# Patient Record
Sex: FEMALE | Race: WHITE | NOT HISPANIC OR LATINO | Employment: OTHER | URBAN - METROPOLITAN AREA
[De-identification: names, ages, dates, MRNs, and addresses within clinical notes are randomized per-mention and may not be internally consistent; named-entity substitution may affect disease eponyms.]

---

## 2020-02-13 ENCOUNTER — OFFICE VISIT (OUTPATIENT)
Dept: PODIATRY | Facility: CLINIC | Age: 55
End: 2020-02-13
Payer: MEDICAID

## 2020-02-13 VITALS
HEART RATE: 89 BPM | OXYGEN SATURATION: 99 % | RESPIRATION RATE: 18 BRPM | SYSTOLIC BLOOD PRESSURE: 123 MMHG | HEIGHT: 67 IN | DIASTOLIC BLOOD PRESSURE: 7 MMHG | WEIGHT: 260 LBS | BODY MASS INDEX: 40.81 KG/M2 | TEMPERATURE: 98 F

## 2020-02-13 DIAGNOSIS — Z89.432 HISTORY OF AMPUTATION OF LEFT FOOT: ICD-10-CM

## 2020-02-13 DIAGNOSIS — L97.521 SKIN ULCER OF LEFT FOOT, LIMITED TO BREAKDOWN OF SKIN: ICD-10-CM

## 2020-02-13 DIAGNOSIS — M32.9 LUPUS: ICD-10-CM

## 2020-02-13 DIAGNOSIS — S91.301A WOUND OF RIGHT FOOT: Primary | ICD-10-CM

## 2020-02-13 DIAGNOSIS — M06.9 RHEUMATOID ARTHRITIS, INVOLVING UNSPECIFIED SITE, UNSPECIFIED RHEUMATOID FACTOR PRESENCE: ICD-10-CM

## 2020-02-13 DIAGNOSIS — I73.9 PERIPHERAL VASCULAR DISEASE: ICD-10-CM

## 2020-02-13 PROCEDURE — 97597 WOUND DEBRIDEMENT: ICD-10-PCS | Mod: S$PBB,,, | Performed by: PODIATRIST

## 2020-02-13 PROCEDURE — 99214 OFFICE O/P EST MOD 30 MIN: CPT | Mod: 25,S$PBB,, | Performed by: PODIATRIST

## 2020-02-13 PROCEDURE — 99203 OFFICE O/P NEW LOW 30 MIN: CPT | Mod: PBBFAC,PN,25 | Performed by: PODIATRIST

## 2020-02-13 PROCEDURE — 99214 PR OFFICE/OUTPT VISIT, EST, LEVL IV, 30-39 MIN: ICD-10-PCS | Mod: 25,S$PBB,, | Performed by: PODIATRIST

## 2020-02-13 PROCEDURE — 99999 PR PBB SHADOW E&M-NEW PATIENT-LVL III: CPT | Mod: PBBFAC,,, | Performed by: PODIATRIST

## 2020-02-13 PROCEDURE — 99999 PR PBB SHADOW E&M-NEW PATIENT-LVL III: ICD-10-PCS | Mod: PBBFAC,,, | Performed by: PODIATRIST

## 2020-02-13 PROCEDURE — 97597 DBRDMT OPN WND 1ST 20 CM/<: CPT | Mod: PBBFAC,PN | Performed by: PODIATRIST

## 2020-02-13 RX ORDER — ALBUTEROL SULFATE 0.83 MG/ML
2.5 SOLUTION RESPIRATORY (INHALATION)
COMMUNITY
Start: 2016-01-19 | End: 2022-02-22

## 2020-02-13 RX ORDER — ATORVASTATIN CALCIUM 10 MG/1
10 TABLET, FILM COATED ORAL
COMMUNITY
Start: 2019-11-18 | End: 2022-09-24 | Stop reason: SDUPTHER

## 2020-02-13 RX ORDER — GABAPENTIN 600 MG/1
600 TABLET ORAL
COMMUNITY
Start: 2019-11-18

## 2020-02-13 RX ORDER — SULFAMETHOXAZOLE AND TRIMETHOPRIM 800; 160 MG/1; MG/1
TABLET ORAL
COMMUNITY
Start: 2019-12-30 | End: 2020-11-03

## 2020-02-13 RX ORDER — MORPHINE SULFATE 30 MG/1
30 TABLET ORAL
COMMUNITY
End: 2021-10-21

## 2020-02-13 RX ORDER — OXYCODONE HYDROCHLORIDE 30 MG/1
TABLET ORAL
COMMUNITY
Start: 2020-02-07 | End: 2024-03-11 | Stop reason: SDUPTHER

## 2020-02-13 RX ORDER — PREDNISONE 20 MG/1
TABLET ORAL
COMMUNITY
Start: 2019-12-30 | End: 2021-05-19 | Stop reason: ALTCHOICE

## 2020-02-13 RX ORDER — ASPIRIN 81 MG/1
81 TABLET ORAL
COMMUNITY

## 2020-02-13 RX ORDER — DULOXETIN HYDROCHLORIDE 60 MG/1
60 CAPSULE, DELAYED RELEASE ORAL
COMMUNITY
Start: 2019-11-18 | End: 2021-10-21

## 2020-02-13 NOTE — LETTER
February 16, 2020      Lashawn Griffiths, DO  [address] [place] [facility] [place] [address]           [facility] - Podiatry/Wound Care  [facility] [place] [address]  Phone: [phone]  Fax: [phone]          Patient: Nevaeh Howard   MR Number: [id number]   YOB: 1965   Date of Visit: 2/13/2020       Dear Dr. Lashawn Griffiths:    Thank you for referring Nevaeh Howard to me for evaluation. Attached you will find relevant portions of my assessment and plan of care.    If you have questions, please do not hesitate to call me. I look forward to following Nevaeh Howard along with you.    Sincerely,    Al Blevins, DONN    Enclosure  CC:  No Recipients    If you would like to receive this communication electronically, please contact [email or web address] or [phone] to request more information on [employer] access.    For providers and/or their staff who would like to refer a patient to [facility], please contact us through our one-stop-shop provider referral line, [facility], at [phone].    If you feel you have received this communication in error or would no longer like to receive these types of communications, please e-mail [email or web address]

## 2020-02-16 PROBLEM — S91.301A WOUND OF RIGHT FOOT: Status: ACTIVE | Noted: 2020-02-16

## 2020-02-16 PROBLEM — M06.9 RHEUMATOID ARTHRITIS: Status: ACTIVE | Noted: 2020-02-16

## 2020-02-16 PROBLEM — Z89.432 HISTORY OF AMPUTATION OF LEFT FOOT: Status: ACTIVE | Noted: 2020-02-16

## 2020-02-16 PROBLEM — L97.521 SKIN ULCER OF LEFT FOOT, LIMITED TO BREAKDOWN OF SKIN: Status: ACTIVE | Noted: 2020-02-16

## 2020-02-16 PROBLEM — I73.9 PERIPHERAL VASCULAR DISEASE: Status: ACTIVE | Noted: 2020-02-16

## 2020-02-16 PROBLEM — M32.9 LUPUS: Status: ACTIVE | Noted: 2020-02-16

## 2020-02-16 NOTE — PROCEDURES
"Wound Debridement  Date/Time: 2/13/2020 10:15 AM  Performed by: Al Blevins DPM  Authorized by: Al Blevins DPM     Time out: Immediately prior to procedure a "time out" was called to verify the correct patient, procedure, equipment, support staff and site/side marked as required.    Consent Done?:  Yes (Verbal)    Preparation: Patient was prepped and draped in usual sterile fashion    Local anesthesia used?: No      Wound Details:    Location:  Left foot    Location:  Left 5th Metatarsal Head    Type of Debridement:  Excisional       Length (cm):  1.5       Area (sq cm):  2.25       Width (cm):  1.5       Percent Debrided (%):  100       Depth (cm):  0.5       Total Area Debrided (sq cm):  2.25    Depth of debridement:  Epidermis/Dermis    Devitalized tissue debrided:  Callus    Instruments:  Blade    Bleeding:  None  Patient tolerance:  Patient tolerated the procedure well with no immediate complications      "

## 2020-02-16 NOTE — PROGRESS NOTES
Subjective:       Patient ID: Nevaeh Howard is a 54 y.o. female.    Chief Complaint: Follow-up; Foot Pain; Foot Problem; Callouses; and Foot Ulcer   Patient presents today she has an extensive medical history and is currently being treated by a rheumatologist for lupus and rheumatoid arthritis.  Patient has a history of poor circulation she has previously had digits amputated on the left foot.  Patient is currently taking Eliquis.  Patient has been referred to us for a wound an ulcerative area on the left foot.    Past Medical History:   Diagnosis Date    Lung disease     Lupus     RA (rheumatoid arthritis)      Past Surgical History:   Procedure Laterality Date    APPENDECTOMY      BACK SURGERY      CHOLECYSTECTOMY      ENDOMICROSCOPY      HYSTERECTOMY      TOE AMPUTATION      2345 left foot     TONSILLECTOMY       Family History   Problem Relation Age of Onset    Cancer Mother     Heart disease Father     Stroke Father      Social History     Socioeconomic History    Marital status: Single     Spouse name: Not on file    Number of children: Not on file    Years of education: Not on file    Highest education level: Not on file   Occupational History    Not on file   Social Needs    Financial resource strain: Not on file    Food insecurity:     Worry: Not on file     Inability: Not on file    Transportation needs:     Medical: Not on file     Non-medical: Not on file   Tobacco Use    Smoking status: Former Smoker     Years: 40.00     Types: Cigarettes    Smokeless tobacco: Never Used    Tobacco comment: quit date 2018   Substance and Sexual Activity    Alcohol use: Never     Frequency: Never    Drug use: Never    Sexual activity: Not Currently   Lifestyle    Physical activity:     Days per week: Not on file     Minutes per session: Not on file    Stress: Not on file   Relationships    Social connections:     Talks on phone: Not on file     Gets together: Not on file     Attends Islam  "service: Not on file     Active member of club or organization: Not on file     Attends meetings of clubs or organizations: Not on file     Relationship status: Not on file   Other Topics Concern    Not on file   Social History Narrative    Not on file       Current Outpatient Medications   Medication Sig Dispense Refill    adalimumab 40 mg/0.4 mL PnKt Inject 40 mg into the skin.      albuterol (PROVENTIL) 2.5 mg /3 mL (0.083 %) nebulizer solution Inhale 2.5 mg into the lungs.      apixaban (ELIQUIS) 5 mg Tab Take 5 mg by mouth.      aspirin (ECOTRIN) 81 MG EC tablet Take 81 mg by mouth.      atorvastatin (LIPITOR) 10 MG tablet Take 10 mg by mouth.      DULoxetine (CYMBALTA) 60 MG capsule Take 60 mg by mouth.      FLUCELVAX QUAD 7978-6342 60 mcg (15 mcg x 4)/0.5 mL Susp       gabapentin (NEURONTIN) 600 MG tablet Take 600 mg by mouth.      morphine (MSIR) 30 MG tablet Take 30 mg by mouth.      oxyCODONE (ROXICODONE) 30 MG Tab       predniSONE (DELTASONE) 20 MG tablet       sulfamethoxazole-trimethoprim 800-160mg (BACTRIM DS) 800-160 mg Tab        No current facility-administered medications for this visit.      Review of patient's allergies indicates:   Allergen Reactions    Toradol [ketorolac]        Review of Systems    Objective:      Vitals:    02/13/20 1031   BP: (!) 123/7   Pulse: 89   Resp: 18   Temp: 98 °F (36.7 °C)   SpO2: 99%   Weight: 117.9 kg (260 lb)   Height: 5' 7" (1.702 m)     Physical Exam                Ulceration and area of injury right great toes approximately 1 cm in diameter the pre ulcerative hyperkeratotic lesion on the left foot is approximately 1.5 cm in diameter.     Assessment:       1. Skin ulcer of left foot, limited to breakdown of skin    2. Peripheral vascular disease    3. Lupus    4. History of amputation of left foot    5. Rheumatoid arthritis, involving unspecified site, unspecified rheumatoid factor presence        Plan:       Patient presents today she has an " extensive medical history and is currently being treated by a rheumatologist for lupus and rheumatoid arthritis.  Patient has a history of poor circulation she has previously had digits amputated on the left foot.  Patient is currently taking Eliquis.  Patient has been referred to us for a wound an ulcerative area on the left foot. Patient is had digits 2 through 5 amputated on the left foot due to poor circulation she states she has also had 3 stents placed which is made considerable difference in the circulation to her lower extremities.  Patient states that she was using a drill at home when the drill slipped hitting her right great toe about a week ago this area has been red swollen she has been applying antibiotic ointment to the area I did evaluate the area there is some redness some swelling I did not do a culture and sensitivity because the patient had antibiotic ointment all over the area this would likely not be accurate I did recommend cleaning the area with Dakin solution and keeping a light dressing on the area discontinuing the antibiotic ointment so the area can dry out.  Patient had a thick hyperkeratotic lesion underlying the 5th metatarsal head on left foot where she has had previous amputation I have advised the patient this is likely related to the amputation she is getting excessive pressure to the lateral portion of her left foot there may be some degenerative changes are bone spurring underlying area I was able to sharply excisionally debride the area removing all the hyperkeratotic nonviable tissue patient tolerated this well there was healthy underlying tissue no active signs of infection noted however I have advised the patient this area needs to be monitored closely to prevent breakdown further ulceration and subsequent infection especially with her history of previous amputation.  I have made some recommendations regarding a good moisturizing cream lotion that the patient can use on the  area to keep it soft to prevent the hyperkeratotic tissue buildup I have advised her this may need to be debrided again ultimately I will consider x-rays depending upon when in if this lesion recurs the patient states she has never had this treated before since the amputation of the digits 2 years ago. Patient advised we may have to make some modifications to her shoes but she needs to make sure she is wearing shoes that are good and supportive in the arch area to take pressure off the forefoot this will likely reduce the pressure overlying the 5th metatarsal head left.  Patient was dispensed Dakin solution to clean the ulceration on the right foot any increased redness swelling pain drainage she is to contact us immediately I did recommend following up with the patient in 6-8 weeks unless she has any problems or questions sooner.  Patient was very reluctant to allow me to trim and debride the ulceration left because her feet are very sensitive however this did not cause her any pain or discomfort.  Patient is not a diabetic she is on Eliquis and needs to monitor her feet closely to prevent complications.  Patient was made aware of the right great toe is not showing significant improvement in 7-10 days she is to contact me sooner for follow-up.  Total face-to-face time including discussion evaluation treatment excisional debridement and wound care bilateral equaled 45 min.This note was created using just[employer]me voice recognition software that occasionally misinterpreted phrases or words.

## 2020-11-03 ENCOUNTER — OFFICE VISIT (OUTPATIENT)
Dept: PODIATRY | Facility: CLINIC | Age: 55
End: 2020-11-03
Payer: MEDICAID

## 2020-11-03 VITALS
WEIGHT: 260 LBS | HEIGHT: 67 IN | BODY MASS INDEX: 40.81 KG/M2 | DIASTOLIC BLOOD PRESSURE: 85 MMHG | SYSTOLIC BLOOD PRESSURE: 153 MMHG | HEART RATE: 99 BPM | TEMPERATURE: 99 F

## 2020-11-03 DIAGNOSIS — Z89.432 HISTORY OF AMPUTATION OF LEFT FOOT: ICD-10-CM

## 2020-11-03 DIAGNOSIS — I99.8 ISCHEMIC TOE: ICD-10-CM

## 2020-11-03 DIAGNOSIS — I73.9 PERIPHERAL VASCULAR DISEASE: Primary | ICD-10-CM

## 2020-11-03 PROCEDURE — 99214 PR OFFICE/OUTPT VISIT, EST, LEVL IV, 30-39 MIN: ICD-10-PCS | Mod: S$PBB,,, | Performed by: PODIATRIST

## 2020-11-03 PROCEDURE — 99999 PR PBB SHADOW E&M-EST. PATIENT-LVL IV: ICD-10-PCS | Mod: PBBFAC,,, | Performed by: PODIATRIST

## 2020-11-03 PROCEDURE — 99214 OFFICE O/P EST MOD 30 MIN: CPT | Mod: PBBFAC,PN | Performed by: PODIATRIST

## 2020-11-03 PROCEDURE — 99999 PR PBB SHADOW E&M-EST. PATIENT-LVL IV: CPT | Mod: PBBFAC,,, | Performed by: PODIATRIST

## 2020-11-03 PROCEDURE — 99214 OFFICE O/P EST MOD 30 MIN: CPT | Mod: S$PBB,,, | Performed by: PODIATRIST

## 2020-11-07 PROBLEM — I99.8 ISCHEMIC TOE: Status: ACTIVE | Noted: 2020-11-07

## 2020-11-08 NOTE — PROGRESS NOTES
Subjective:       Patient ID: Nevaeh Howard is a 55 y.o. female.    Chief Complaint: Follow-up, Foot Pain, and Foot Problem   Patient presents today she has an extensive medical history and is currently being treated by a rheumatologist for lupus and rheumatoid arthritis.  Patient has a history of poor circulation she has previously had digits amputated on the left foot.  Patient is currently taking Eliquis.  Patient presents today stating she is having severe pain her right foot that started about 5 days ago.    Past Medical History:   Diagnosis Date    Lung disease     Lupus     RA (rheumatoid arthritis)      Past Surgical History:   Procedure Laterality Date    APPENDECTOMY      BACK SURGERY      CHOLECYSTECTOMY      ENDOMICROSCOPY      HYSTERECTOMY      TOE AMPUTATION      2345 left foot     TONSILLECTOMY       Family History   Problem Relation Age of Onset    Cancer Mother     Heart disease Father     Stroke Father      Social History     Socioeconomic History    Marital status: Single     Spouse name: Not on file    Number of children: Not on file    Years of education: Not on file    Highest education level: Not on file   Occupational History    Not on file   Social Needs    Financial resource strain: Not on file    Food insecurity     Worry: Not on file     Inability: Not on file    Transportation needs     Medical: Not on file     Non-medical: Not on file   Tobacco Use    Smoking status: Former Smoker     Years: 40.00     Types: Cigarettes    Smokeless tobacco: Never Used    Tobacco comment: quit date 2018   Substance and Sexual Activity    Alcohol use: Never     Frequency: Never    Drug use: Never    Sexual activity: Not Currently   Lifestyle    Physical activity     Days per week: Not on file     Minutes per session: Not on file    Stress: Not on file   Relationships    Social connections     Talks on phone: Not on file     Gets together: Not on file     Attends [employer]  "service: Not on file     Active member of club or organization: Not on file     Attends meetings of clubs or organizations: Not on file     Relationship status: Not on file   Other Topics Concern    Not on file   Social History Narrative    Not on file       Current Outpatient Medications   Medication Sig Dispense Refill    adalimumab 40 mg/0.4 mL PnKt Inject 40 mg into the skin.      albuterol (PROVENTIL) 2.5 mg /3 mL (0.083 %) nebulizer solution Inhale 2.5 mg into the lungs.      apixaban (ELIQUIS) 5 mg Tab Take 5 mg by mouth.      aspirin (ECOTRIN) 81 MG EC tablet Take 81 mg by mouth.      atorvastatin (LIPITOR) 10 MG tablet Take 10 mg by mouth.      DULoxetine (CYMBALTA) 60 MG capsule Take 60 mg by mouth.      FLUCELVAX QUAD [id number] 60 mcg (15 mcg x 4)/0.5 mL Susp       gabapentin (NEURONTIN) 600 MG tablet Take 600 mg by mouth.      morphine (MSIR) 30 MG tablet Take 30 mg by mouth.      oxyCODONE (ROXICODONE) 30 MG Tab       predniSONE (DELTASONE) 20 MG tablet        No current facility-administered medications for this visit.      Review of patient's allergies indicates:   Allergen Reactions    Ketorolac Hives     "Makes me break out"       Review of Systems   Skin: Positive for color change.   All other systems reviewed and are negative.      Objective:      Vitals:    11/03/20 1431   BP: (!) 153/85   Pulse: 99   Temp: 98.7 °F (37.1 °C)   Weight: 117.9 kg (260 lb)   Height: 5' 7" (1.702 m)     Physical Exam  Vitals signs and nursing note reviewed.   Constitutional:       Appearance: Normal appearance.   Cardiovascular:      Pulses:           Dorsalis pedis pulses are 0 on the right side and 1+ on the left side.        Posterior tibial pulses are 0 on the right side and 1+ on the left side.   Pulmonary:      Effort: Pulmonary effort is normal.   Musculoskeletal:         General: Tenderness present.        Feet:    Feet:      Right foot:      Protective Sensation: 2 sites tested. 2 sites sensed. "      Skin integrity: Erythema present.      Left foot:      Protective Sensation: 2 sites tested. 2 sites sensed.      Skin integrity: Callus present.   Skin:     Capillary Refill: Capillary refill takes more than 3 seconds.      Findings: Erythema present.   Neurological:      General: No focal deficit present.      Mental Status: She is alert.   Psychiatric:         Mood and Affect: Mood normal.         Behavior: Behavior normal.         Thought Content: Thought content normal.         Judgment: Judgment normal.                                              Assessment:       1. Peripheral vascular disease    2. History of amputation of left foot    3. Ischemic toe        Plan:       Patient presents today she has an extensive medical history and is currently being treated by a rheumatologist for lupus and rheumatoid arthritis.  Patient has a history of poor circulation she has previously had digits amputated on the left foot.  Patient is currently taking Eliquis.  Patient presents today stating she is having severe pain her right foot that started about 5 days ago.  Patient has a history of peripheral vascular disease she is previously seen vascular surgery at [facility] she states she has not been able to follow up with vascular surgery because they long longer accept her insurance.  Patient has had previous stent placement and states she was last seen by vascular surgery in February of 2020.  Patient relates no trauma or injury to the right foot however when the patient was in a dependent position the 2nd and 5th digit became blue and purple in coloration pulses in the right foot were nonpalpable skin temperature was warm to cold from the anterior tibia to the distal digits right.  Patient states that she is currently taking both Eliquis and her aspirin she is under the care of pain management but states her pain medication is not helping with her discomfort I explained to the patient that she is having  ischemic pain in the circulation is shutting down to the digits on her right foot I have advised the patient she needs to be seen by vascular surgery immediately sheets she has previously been treated at [facility] and has an established vascular surgeon over there I have recommended that she go to their emergency room immediately I have advised her this is a serious situation and I have recommended going directly to the emergency room trying to give her a referral to a vascular surgeon other than the 1 she has established with his going to take a longer period of time and certainly her vascular surgeon knows her well and she needs to follow up with him immediately.  Patient was in understanding and agreement with this I did apply Nitro-Bid ointment overlying the midfoot to help open up circulation to the 2nd and 5th digit I have given patient samples of Nitro-Bid ointment I showed her how to apply this to the dorsal aspect of the foot not to the digits themselves and cover the area with a light bandage she indicated she would be going to the emergency room immediately as recommended.  Patient has been advised to contact us with any problems questions or concerns but at this point her best option is to be seen by her vascular surgeon who is previously treated her.  Patient indicated she underwent amputation of the toes on the left foot under the same circumstances she is trying to avoid this on her right foot.  Patient was in understanding and agreement with everything discussed today in indicated she would follow my instructions and go to the emergency room as directed.  While the patient was in the office her digits turned more and more blue purple in color and her ischemic pain significantly increased.  Patient is also been having hip pain she does have a stent in her right side it is possible the stent may be blocked at this point.  Patient did have a pre ulcerative hyperkeratotic lesion on the left foot  but there were no current skin breaks or signs of infection on the left foot no open ulceration was noted on the right foot during today's exam.  This note was created using [employer] voice recognition software that occasionally misinterpreted phrases or words.

## 2021-05-19 ENCOUNTER — HOSPITAL ENCOUNTER (OUTPATIENT)
Dept: RADIOLOGY | Facility: HOSPITAL | Age: 56
Discharge: HOME | End: 2021-05-19
Attending: PODIATRIST
Payer: MEDICAID

## 2021-05-19 ENCOUNTER — OFFICE VISIT (OUTPATIENT)
Dept: PODIATRY | Facility: CLINIC | Age: 56
End: 2021-05-19
Payer: MEDICAID

## 2021-05-19 VITALS
BODY MASS INDEX: 40.81 KG/M2 | HEIGHT: 67 IN | SYSTOLIC BLOOD PRESSURE: 124 MMHG | DIASTOLIC BLOOD PRESSURE: 82 MMHG | WEIGHT: 260 LBS | RESPIRATION RATE: 18 BRPM | HEART RATE: 91 BPM

## 2021-05-19 DIAGNOSIS — Z89.432 HISTORY OF AMPUTATION OF LEFT FOOT: ICD-10-CM

## 2021-05-19 DIAGNOSIS — I73.9 PERIPHERAL VASCULAR DISEASE: ICD-10-CM

## 2021-05-19 DIAGNOSIS — L97.521 SKIN ULCER OF LEFT FOOT, LIMITED TO BREAKDOWN OF SKIN: Primary | ICD-10-CM

## 2021-05-19 PROCEDURE — 99214 PR OFFICE/OUTPT VISIT, EST, LEVL IV, 30-39 MIN: ICD-10-PCS | Mod: S$PBB,,, | Performed by: PODIATRIST

## 2021-05-19 PROCEDURE — 99214 OFFICE O/P EST MOD 30 MIN: CPT | Mod: S$PBB,,, | Performed by: PODIATRIST

## 2021-05-19 PROCEDURE — 99215 OFFICE O/P EST HI 40 MIN: CPT | Mod: PBBFAC,25 | Performed by: PODIATRIST

## 2021-05-19 PROCEDURE — 73630 XR FOOT COMPLETE 3 VIEW LEFT: ICD-10-PCS | Mod: 26,LT,, | Performed by: RADIOLOGY

## 2021-05-19 PROCEDURE — 99999 PR PBB SHADOW E&M-EST. PATIENT-LVL V: ICD-10-PCS | Mod: PBBFAC,,, | Performed by: PODIATRIST

## 2021-05-19 PROCEDURE — 99999 PR PBB SHADOW E&M-EST. PATIENT-LVL V: CPT | Mod: PBBFAC,,, | Performed by: PODIATRIST

## 2021-05-19 PROCEDURE — 73630 X-RAY EXAM OF FOOT: CPT | Mod: TC,FY,LT

## 2021-05-19 PROCEDURE — 73630 X-RAY EXAM OF FOOT: CPT | Mod: 26,LT,, | Performed by: RADIOLOGY

## 2021-05-19 RX ORDER — MORPHINE SULFATE 30 MG/1
TABLET, FILM COATED, EXTENDED RELEASE ORAL
COMMUNITY
Start: 2021-04-29 | End: 2021-09-05 | Stop reason: HOSPADM

## 2021-05-19 RX ORDER — RIVAROXABAN 20 MG/1
1 TABLET, FILM COATED ORAL NIGHTLY
COMMUNITY
Start: 2021-03-04 | End: 2022-02-22

## 2021-05-19 RX ORDER — GABAPENTIN 600 MG/1
600 TABLET ORAL
COMMUNITY
Start: 2021-01-07 | End: 2021-05-19 | Stop reason: SDUPTHER

## 2021-05-19 RX ORDER — ADALIMUMAB 40MG/0.4ML
40 KIT SUBCUTANEOUS
COMMUNITY
Start: 2021-05-03 | End: 2022-09-24

## 2021-05-19 RX ORDER — RIVAROXABAN 20 MG/1
20 TABLET, FILM COATED ORAL
COMMUNITY
Start: 2021-03-04 | End: 2022-02-22

## 2021-05-19 RX ORDER — AMLODIPINE BESYLATE 5 MG/1
5 TABLET ORAL DAILY
COMMUNITY
Start: 2021-03-04 | End: 2021-05-19 | Stop reason: SDUPTHER

## 2021-05-19 RX ORDER — ATORVASTATIN CALCIUM 10 MG/1
10 TABLET, FILM COATED ORAL
COMMUNITY
Start: 2021-01-07 | End: 2021-05-19 | Stop reason: SDUPTHER

## 2021-05-19 RX ORDER — ALBUTEROL SULFATE 90 UG/1
2 AEROSOL, METERED RESPIRATORY (INHALATION) EVERY 6 HOURS PRN
COMMUNITY
Start: 2021-01-07

## 2021-05-19 RX ORDER — ALBUTEROL SULFATE 90 UG/1
AEROSOL, METERED RESPIRATORY (INHALATION)
COMMUNITY
Start: 2021-02-27 | End: 2021-05-19

## 2021-05-19 RX ORDER — DULOXETIN HYDROCHLORIDE 60 MG/1
60 CAPSULE, DELAYED RELEASE ORAL
COMMUNITY
Start: 2021-01-07 | End: 2021-05-19 | Stop reason: SDUPTHER

## 2021-05-19 RX ORDER — PREDNISONE 5 MG/1
5 TABLET ORAL
COMMUNITY
Start: 2020-12-29 | End: 2021-10-21

## 2021-05-19 RX ORDER — CLOPIDOGREL BISULFATE 75 MG/1
75 TABLET ORAL DAILY
COMMUNITY
Start: 2021-03-04 | End: 2021-05-19 | Stop reason: SDUPTHER

## 2021-05-19 RX ORDER — AMLODIPINE BESYLATE 5 MG/1
5 TABLET ORAL
COMMUNITY
Start: 2021-03-04 | End: 2022-02-22

## 2021-05-19 RX ORDER — CLOPIDOGREL BISULFATE 75 MG/1
75 TABLET ORAL
COMMUNITY
Start: 2021-03-04 | End: 2021-10-21

## 2021-09-01 ENCOUNTER — TELEPHONE (OUTPATIENT)
Dept: PODIATRY | Facility: CLINIC | Age: 56
End: 2021-09-01

## 2021-09-02 ENCOUNTER — OFFICE VISIT (OUTPATIENT)
Dept: PODIATRY | Facility: CLINIC | Age: 56
End: 2021-09-02
Payer: MEDICAID

## 2021-09-02 VITALS
WEIGHT: 260 LBS | DIASTOLIC BLOOD PRESSURE: 90 MMHG | HEIGHT: 67 IN | SYSTOLIC BLOOD PRESSURE: 137 MMHG | TEMPERATURE: 98 F | BODY MASS INDEX: 40.81 KG/M2 | HEART RATE: 88 BPM

## 2021-09-02 DIAGNOSIS — Z89.432 HISTORY OF AMPUTATION OF LEFT FOOT: ICD-10-CM

## 2021-09-02 DIAGNOSIS — L84 PRE-ULCERATIVE CALLUSES: ICD-10-CM

## 2021-09-02 DIAGNOSIS — R23.4 FISSURE IN SKIN OF FOOT: Primary | ICD-10-CM

## 2021-09-02 DIAGNOSIS — I73.9 PERIPHERAL VASCULAR DISEASE: ICD-10-CM

## 2021-09-02 PROCEDURE — 99215 PR OFFICE/OUTPT VISIT, EST, LEVL V, 40-54 MIN: ICD-10-PCS | Mod: S$PBB,,, | Performed by: PODIATRIST

## 2021-09-02 PROCEDURE — 99214 OFFICE O/P EST MOD 30 MIN: CPT | Mod: PBBFAC | Performed by: PODIATRIST

## 2021-09-02 PROCEDURE — 99999 PR PBB SHADOW E&M-EST. PATIENT-LVL IV: CPT | Mod: PBBFAC,,, | Performed by: PODIATRIST

## 2021-09-02 PROCEDURE — 99215 OFFICE O/P EST HI 40 MIN: CPT | Mod: S$PBB,,, | Performed by: PODIATRIST

## 2021-09-02 PROCEDURE — 99999 PR PBB SHADOW E&M-EST. PATIENT-LVL IV: ICD-10-PCS | Mod: PBBFAC,,, | Performed by: PODIATRIST

## 2021-09-02 RX ORDER — NALOXONE HYDROCHLORIDE 4 MG/.1ML
4 SPRAY NASAL
COMMUNITY
Start: 2021-07-01

## 2021-09-02 RX ORDER — HYDROXYCHLOROQUINE SULFATE 200 MG/1
200 TABLET, FILM COATED ORAL 2 TIMES DAILY
COMMUNITY
Start: 2021-08-31 | End: 2021-09-05 | Stop reason: HOSPADM

## 2021-10-12 ENCOUNTER — TELEPHONE (OUTPATIENT)
Dept: PODIATRY | Facility: CLINIC | Age: 56
End: 2021-10-12

## 2021-10-21 ENCOUNTER — OFFICE VISIT (OUTPATIENT)
Dept: PODIATRY | Facility: CLINIC | Age: 56
End: 2021-10-21
Payer: MEDICAID

## 2021-10-21 VITALS
DIASTOLIC BLOOD PRESSURE: 95 MMHG | WEIGHT: 260 LBS | SYSTOLIC BLOOD PRESSURE: 145 MMHG | HEART RATE: 99 BPM | TEMPERATURE: 98 F | HEIGHT: 67 IN | BODY MASS INDEX: 40.81 KG/M2

## 2021-10-21 DIAGNOSIS — Z89.432 HISTORY OF AMPUTATION OF LEFT FOOT: ICD-10-CM

## 2021-10-21 DIAGNOSIS — S91.301A WOUND OF RIGHT FOOT: ICD-10-CM

## 2021-10-21 DIAGNOSIS — I99.8 ISCHEMIC TOE: ICD-10-CM

## 2021-10-21 DIAGNOSIS — I73.9 PERIPHERAL VASCULAR DISEASE: ICD-10-CM

## 2021-10-21 DIAGNOSIS — L97.521 SKIN ULCER OF LEFT FOOT, LIMITED TO BREAKDOWN OF SKIN: Primary | ICD-10-CM

## 2021-10-21 PROCEDURE — 99999 PR PBB SHADOW E&M-EST. PATIENT-LVL IV: CPT | Mod: PBBFAC,,, | Performed by: PODIATRIST

## 2021-10-21 PROCEDURE — 99214 OFFICE O/P EST MOD 30 MIN: CPT | Mod: S$PBB,,, | Performed by: PODIATRIST

## 2021-10-21 PROCEDURE — 99214 OFFICE O/P EST MOD 30 MIN: CPT | Mod: PBBFAC,PN | Performed by: PODIATRIST

## 2021-10-21 PROCEDURE — 99999 PR PBB SHADOW E&M-EST. PATIENT-LVL IV: ICD-10-PCS | Mod: PBBFAC,,, | Performed by: PODIATRIST

## 2021-10-21 PROCEDURE — 99214 PR OFFICE/OUTPT VISIT, EST, LEVL IV, 30-39 MIN: ICD-10-PCS | Mod: S$PBB,,, | Performed by: PODIATRIST

## 2021-10-21 RX ORDER — PREDNISONE 20 MG/1
TABLET ORAL
COMMUNITY
Start: 2021-10-06 | End: 2022-02-22

## 2021-10-21 RX ORDER — DULOXETIN HYDROCHLORIDE 60 MG/1
60 CAPSULE, DELAYED RELEASE ORAL
COMMUNITY
Start: 2021-09-07 | End: 2022-09-24 | Stop reason: SDUPTHER

## 2021-10-21 RX ORDER — PRAMIPEXOLE DIHYDROCHLORIDE 0.12 MG/1
TABLET ORAL
COMMUNITY
Start: 2021-09-07 | End: 2022-09-24

## 2021-10-21 RX ORDER — CLOPIDOGREL BISULFATE 75 MG/1
75 TABLET ORAL
COMMUNITY
Start: 2021-09-07

## 2021-10-21 RX ORDER — MUPIROCIN 20 MG/G
OINTMENT TOPICAL
COMMUNITY
Start: 2021-09-07 | End: 2024-03-11

## 2021-10-21 RX ORDER — MORPHINE SULFATE 30 MG/1
TABLET, FILM COATED, EXTENDED RELEASE ORAL NIGHTLY
COMMUNITY
Start: 2021-09-29 | End: 2023-10-29

## 2021-10-21 RX ORDER — MUPIROCIN CALCIUM 20 MG/G
1 CREAM TOPICAL 2 TIMES DAILY
COMMUNITY
Start: 2021-09-07 | End: 2022-09-07

## 2022-02-22 ENCOUNTER — OFFICE VISIT (OUTPATIENT)
Dept: PODIATRY | Facility: CLINIC | Age: 57
End: 2022-02-22
Payer: MEDICAID

## 2022-02-22 VITALS
HEART RATE: 86 BPM | HEIGHT: 67 IN | BODY MASS INDEX: 39.55 KG/M2 | DIASTOLIC BLOOD PRESSURE: 88 MMHG | SYSTOLIC BLOOD PRESSURE: 148 MMHG | TEMPERATURE: 97 F | WEIGHT: 252 LBS

## 2022-02-22 DIAGNOSIS — Z89.432 HISTORY OF AMPUTATION OF LEFT FOOT: ICD-10-CM

## 2022-02-22 DIAGNOSIS — L97.521 SKIN ULCER OF LEFT FOOT, LIMITED TO BREAKDOWN OF SKIN: Primary | ICD-10-CM

## 2022-02-22 DIAGNOSIS — M06.9 RHEUMATOID ARTHRITIS, INVOLVING UNSPECIFIED SITE, UNSPECIFIED WHETHER RHEUMATOID FACTOR PRESENT: ICD-10-CM

## 2022-02-22 DIAGNOSIS — I73.9 PERIPHERAL VASCULAR DISEASE: ICD-10-CM

## 2022-02-22 DIAGNOSIS — M19.079 OSTEOARTHRITIS OF ANKLE AND FOOT, UNSPECIFIED LATERALITY: ICD-10-CM

## 2022-02-22 DIAGNOSIS — M21.6X9 PLANTARFLEXION DEFORMITY OF FOOT: ICD-10-CM

## 2022-02-22 PROCEDURE — 1159F MED LIST DOCD IN RCRD: CPT | Mod: CPTII,,, | Performed by: PODIATRIST

## 2022-02-22 PROCEDURE — 99999 PR PBB SHADOW E&M-EST. PATIENT-LVL V: CPT | Mod: PBBFAC,,, | Performed by: PODIATRIST

## 2022-02-22 PROCEDURE — 3079F PR MOST RECENT DIASTOLIC BLOOD PRESSURE 80-89 MM HG: ICD-10-PCS | Mod: CPTII,,, | Performed by: PODIATRIST

## 2022-02-22 PROCEDURE — 99215 OFFICE O/P EST HI 40 MIN: CPT | Mod: PBBFAC,PN | Performed by: PODIATRIST

## 2022-02-22 PROCEDURE — 1160F RVW MEDS BY RX/DR IN RCRD: CPT | Mod: CPTII,,, | Performed by: PODIATRIST

## 2022-02-22 PROCEDURE — 1159F PR MEDICATION LIST DOCUMENTED IN MEDICAL RECORD: ICD-10-PCS | Mod: CPTII,,, | Performed by: PODIATRIST

## 2022-02-22 PROCEDURE — 3008F PR BODY MASS INDEX (BMI) DOCUMENTED: ICD-10-PCS | Mod: CPTII,,, | Performed by: PODIATRIST

## 2022-02-22 PROCEDURE — 1160F PR REVIEW ALL MEDS BY PRESCRIBER/CLIN PHARMACIST DOCUMENTED: ICD-10-PCS | Mod: CPTII,,, | Performed by: PODIATRIST

## 2022-02-22 PROCEDURE — 99999 PR PBB SHADOW E&M-EST. PATIENT-LVL V: ICD-10-PCS | Mod: PBBFAC,,, | Performed by: PODIATRIST

## 2022-02-22 PROCEDURE — 3079F DIAST BP 80-89 MM HG: CPT | Mod: CPTII,,, | Performed by: PODIATRIST

## 2022-02-22 PROCEDURE — 99214 PR OFFICE/OUTPT VISIT, EST, LEVL IV, 30-39 MIN: ICD-10-PCS | Mod: S$PBB,,, | Performed by: PODIATRIST

## 2022-02-22 PROCEDURE — 3008F BODY MASS INDEX DOCD: CPT | Mod: CPTII,,, | Performed by: PODIATRIST

## 2022-02-22 PROCEDURE — 99214 OFFICE O/P EST MOD 30 MIN: CPT | Mod: S$PBB,,, | Performed by: PODIATRIST

## 2022-02-22 PROCEDURE — 3077F SYST BP >= 140 MM HG: CPT | Mod: CPTII,,, | Performed by: PODIATRIST

## 2022-02-22 PROCEDURE — 3077F PR MOST RECENT SYSTOLIC BLOOD PRESSURE >= 140 MM HG: ICD-10-PCS | Mod: CPTII,,, | Performed by: PODIATRIST

## 2022-02-22 RX ORDER — AMLODIPINE BESYLATE 5 MG/1
5 TABLET ORAL
COMMUNITY
Start: 2021-09-07 | End: 2022-09-24 | Stop reason: SDUPTHER

## 2022-02-22 RX ORDER — ETANERCEPT 50 MG/ML
50 SOLUTION SUBCUTANEOUS
COMMUNITY
Start: 2021-11-01 | End: 2022-09-24

## 2022-02-22 RX ORDER — PREDNISONE 5 MG/1
TABLET ORAL
COMMUNITY
Start: 2021-11-01 | End: 2022-09-24

## 2022-02-24 PROBLEM — M21.6X9 PLANTARFLEXION DEFORMITY OF FOOT: Status: ACTIVE | Noted: 2022-02-24

## 2022-02-24 PROBLEM — M19.079 OSTEOARTHRITIS OF ANKLE AND FOOT: Status: ACTIVE | Noted: 2022-02-24

## 2022-02-24 NOTE — PROGRESS NOTES
Subjective:       Patient ID: Nevaeh Howard is a 56 y.o. female.    Chief Complaint: Callouses, Foot Pain, and Follow-up   Patient presents today she has an extensive medical history and is currently being treated by a rheumatologist for lupus and rheumatoid arthritis.  Patient has a history of poor circulation she has previously had digits amputated on the left foot.  Patient is currently taking Eliquis.      Past Medical History:   Diagnosis Date    Lung disease     Lupus     RA (rheumatoid arthritis)      Past Surgical History:   Procedure Laterality Date    APPENDECTOMY      BACK SURGERY      CHOLECYSTECTOMY      ENDOMICROSCOPY      HYSTERECTOMY      TOE AMPUTATION      2345 left foot     TONSILLECTOMY       Family History   Problem Relation Age of Onset    Cancer Mother     Heart disease Father     Stroke Father      Social History     Socioeconomic History    Marital status: Single   Tobacco Use    Smoking status: Former Smoker     Years: 40.00     Types: Cigarettes    Smokeless tobacco: Never Used    Tobacco comment: quit date 2018   Substance and Sexual Activity    Alcohol use: Never    Drug use: Never    Sexual activity: Not Currently       Current Outpatient Medications   Medication Sig Dispense Refill    amLODIPine (NORVASC) 5 MG tablet Take 5 mg by mouth.      etanercept (ENBREL) 50 mg/mL (1 mL) injection Inject 50 mg into the skin.      predniSONE (DELTASONE) 5 MG tablet Day 1-3: 30mg, Day 4-6: 20mg, Day 7-9: 15mg, Day 10-12: 10mg, Day 13- beyond 5mg po daily      rivaroxaban (XARELTO) 20 mg Tab Take 20 mg by mouth.      adalimumab (HUMIRA,CF, PEN) 40 mg/0.4 mL PnKt Inject 40 mg into the skin.      albuterol (PROVENTIL/VENTOLIN HFA) 90 mcg/actuation inhaler Inhale 2 puffs into the lungs every 6 (six) hours as needed.      apixaban (ELIQUIS) 5 mg Tab Take 5 mg by mouth.      aspirin (ECOTRIN) 81 MG EC tablet Take 81 mg by mouth.      atorvastatin (LIPITOR) 10 MG tablet Take 10  "mg by mouth.      clopidogreL (PLAVIX) 75 mg tablet Take 75 mg by mouth.      DULoxetine (CYMBALTA) 60 MG capsule Take 60 mg by mouth.      gabapentin (NEURONTIN) 600 MG tablet Take 600 mg by mouth.      morphine (MS CONTIN) 30 MG 12 hr tablet Take by mouth nightly.      mupirocin (BACTROBAN) 2 % ointment SMARTSI Application Topical 2-3 Times Daily      mupirocin calcium 2% (BACTROBAN) 2 % cream Apply 1 application topically 2 (two) times daily.      NARCAN 4 mg/actuation Spry 4 mg by Nasal route as needed.      oxyCODONE (ROXICODONE) 30 MG Tab       pramipexole (MIRAPEX) 0.125 MG tablet Take 1 tab daily at bedtime x 5 days, then 2 tabs daily at bedtime x 5 days, then increase 3 tabs daily at bedtime      umeclidinium-vilanteroL (ANORO ELLIPTA) 62.5-25 mcg/actuation DsDv Inhale 1 puff into the lungs.       No current facility-administered medications for this visit.     Review of patient's allergies indicates:   Allergen Reactions    Ketorolac Hives     "Makes me break out"       Review of Systems   Skin: Positive for color change.   All other systems reviewed and are negative.      Objective:      Vitals:    22 1428   BP: (!) 148/88   Pulse: 86   Temp: 97.3 °F (36.3 °C)   Weight: 114.3 kg (252 lb)   Height: 5' 7" (1.702 m)     Physical Exam  Vitals and nursing note reviewed.   Constitutional:       Appearance: Normal appearance.   Cardiovascular:      Pulses:           Dorsalis pedis pulses are 0 on the right side and 1+ on the left side.        Posterior tibial pulses are 0 on the right side and 1+ on the left side.   Pulmonary:      Effort: Pulmonary effort is normal.   Musculoskeletal:         General: Tenderness present.        Feet:                        Feet:      Right foot:      Protective Sensation: 2 sites tested. 2 sites sensed.      Skin integrity: Erythema present.      Left foot:      Protective Sensation: 2 sites tested. 2 sites sensed.      Skin integrity: Callus present. "   Skin:     Capillary Refill: Capillary refill takes more than 3 seconds.      Findings: Erythema present.   Neurological:      General: No focal deficit present.      Mental Status: She is alert.   Psychiatric:         Mood and Affect: Mood normal.         Behavior: Behavior normal.         Thought Content: Thought content normal.         Judgment: Judgment normal.                                                      Assessment:       1. Skin ulcer of left foot, limited to breakdown of skin    2. Peripheral vascular disease    3. History of amputation of left foot    4. Rheumatoid arthritis, involving unspecified site, unspecified whether rheumatoid factor present    5. Osteoarthritis of ankle and foot, unspecified laterality    6. Plantarflexion deformity of foot        Plan:       Patient presents today she has an extensive medical history and is currently being treated by a rheumatologist for lupus and rheumatoid arthritis.  Patient has a history of poor circulation she has previously had digits amputated on the left foot.  Patient is currently taking Eliquis.  Patient has a history of peripheral vascular disease she is previously seen vascular surgery at [facility].  s  Patient is concerned about the areas of cracking skin breakdown ulceration on the plantar forefoot left she was questioning whether not she needs to have surgery performed to address the prominent bones in the forefoot following previous amputation.  I have advised against this and would not recommend this unless absolutely necessary with the patient's history of peripheral vascular disease I have recommended keeping the area well hydrated moisturized to prevent the fissuring cracking and excessive dry skin I have recommended a 40% urea cream this needs to be applied twice a day every day to both of the patient's feet to prevent areas of breakdown.  I did apply an over-the-counter skin protectant to the patient's left foot today that she  can use in the meantime while she is waiting to get the 40% urea cream that I have recommended that she order.  Patient was in understanding and agreement with this I have advised she needs to keep her skin well protected she needs to monitor her feet carefully and needs to wear shoes at all times.  Plan follow-up will be as needed patient advised if the 40% urea is not helping her to contact us for follow.  I did provide surgical consultation today regarding the patient's left foot and advised her this would definitely be a last option especially because of her vascular compromise there is possibilities of nonhealing and certainly she would need to be off of her feet for 4-6 weeks.  I also addressed the 5th digit right where the patient was having a lot of pain discomfort with a pre ulcerative area on the lateral aspect of the 5th digit right.  Surgical consultation provided.  Patient has continued to use the 40% urea on a daily basis and states it does help she needs to continue using this placing it over the area of pre ulcerative callus and covering the area every day.  Total time including discussion evaluation treatment wound care and wound debridement equaled 30 min.  This note was created using [employer] voice recognition software that occasionally misinterpreted phrases or words.

## 2022-09-22 ENCOUNTER — HOSPITAL ENCOUNTER (OUTPATIENT)
Dept: RADIOLOGY | Facility: HOSPITAL | Age: 57
Discharge: HOME | End: 2022-09-22
Attending: PODIATRIST
Payer: MEDICAID

## 2022-09-22 ENCOUNTER — OFFICE VISIT (OUTPATIENT)
Dept: PODIATRY | Facility: CLINIC | Age: 57
End: 2022-09-22
Payer: MEDICAID

## 2022-09-22 VITALS
DIASTOLIC BLOOD PRESSURE: 89 MMHG | BODY MASS INDEX: 39.08 KG/M2 | HEART RATE: 93 BPM | RESPIRATION RATE: 18 BRPM | WEIGHT: 249 LBS | HEIGHT: 67 IN | SYSTOLIC BLOOD PRESSURE: 152 MMHG

## 2022-09-22 DIAGNOSIS — L84 PRE-ULCERATIVE CALLUSES: ICD-10-CM

## 2022-09-22 DIAGNOSIS — M21.962 ACQUIRED DEFORMITY JOINT FOOT, LEFT: ICD-10-CM

## 2022-09-22 DIAGNOSIS — Z89.432 HISTORY OF AMPUTATION OF LEFT FOOT: ICD-10-CM

## 2022-09-22 DIAGNOSIS — M06.9 RHEUMATOID ARTHRITIS, INVOLVING UNSPECIFIED SITE, UNSPECIFIED WHETHER RHEUMATOID FACTOR PRESENT: ICD-10-CM

## 2022-09-22 DIAGNOSIS — M79.672 LEFT FOOT PAIN: ICD-10-CM

## 2022-09-22 DIAGNOSIS — M77.52 BURSITIS OF LEFT FOOT: Primary | ICD-10-CM

## 2022-09-22 PROCEDURE — 1159F PR MEDICATION LIST DOCUMENTED IN MEDICAL RECORD: ICD-10-PCS | Mod: CPTII,,, | Performed by: PODIATRIST

## 2022-09-22 PROCEDURE — 3079F PR MOST RECENT DIASTOLIC BLOOD PRESSURE 80-89 MM HG: ICD-10-PCS | Mod: CPTII,,, | Performed by: PODIATRIST

## 2022-09-22 PROCEDURE — 73630 X-RAY EXAM OF FOOT: CPT | Mod: TC,LT

## 2022-09-22 PROCEDURE — 73630 XR FOOT COMPLETE 3 VIEW LEFT: ICD-10-PCS | Mod: 26,LT,, | Performed by: RADIOLOGY

## 2022-09-22 PROCEDURE — 3008F PR BODY MASS INDEX (BMI) DOCUMENTED: ICD-10-PCS | Mod: CPTII,,, | Performed by: PODIATRIST

## 2022-09-22 PROCEDURE — 99214 PR OFFICE/OUTPT VISIT, EST, LEVL IV, 30-39 MIN: ICD-10-PCS | Mod: S$PBB,,, | Performed by: PODIATRIST

## 2022-09-22 PROCEDURE — 99215 OFFICE O/P EST HI 40 MIN: CPT | Mod: PBBFAC | Performed by: PODIATRIST

## 2022-09-22 PROCEDURE — 73630 X-RAY EXAM OF FOOT: CPT | Mod: 26,LT,, | Performed by: RADIOLOGY

## 2022-09-22 PROCEDURE — 3077F SYST BP >= 140 MM HG: CPT | Mod: CPTII,,, | Performed by: PODIATRIST

## 2022-09-22 PROCEDURE — 3079F DIAST BP 80-89 MM HG: CPT | Mod: CPTII,,, | Performed by: PODIATRIST

## 2022-09-22 PROCEDURE — 99214 OFFICE O/P EST MOD 30 MIN: CPT | Mod: S$PBB,,, | Performed by: PODIATRIST

## 2022-09-22 PROCEDURE — 3077F PR MOST RECENT SYSTOLIC BLOOD PRESSURE >= 140 MM HG: ICD-10-PCS | Mod: CPTII,,, | Performed by: PODIATRIST

## 2022-09-22 PROCEDURE — 3008F BODY MASS INDEX DOCD: CPT | Mod: CPTII,,, | Performed by: PODIATRIST

## 2022-09-22 PROCEDURE — 1159F MED LIST DOCD IN RCRD: CPT | Mod: CPTII,,, | Performed by: PODIATRIST

## 2022-09-22 PROCEDURE — 99999 PR PBB SHADOW E&M-EST. PATIENT-LVL V: ICD-10-PCS | Mod: PBBFAC,,, | Performed by: PODIATRIST

## 2022-09-22 PROCEDURE — 99999 PR PBB SHADOW E&M-EST. PATIENT-LVL V: CPT | Mod: PBBFAC,,, | Performed by: PODIATRIST

## 2022-09-22 RX ORDER — CLINDAMYCIN HYDROCHLORIDE 300 MG/1
300 CAPSULE ORAL 3 TIMES DAILY
COMMUNITY
Start: 2022-09-21 | End: 2023-10-29

## 2022-09-22 RX ORDER — ETANERCEPT 50 MG/ML
SOLUTION SUBCUTANEOUS
COMMUNITY
Start: 2022-08-09 | End: 2022-09-24

## 2022-09-22 RX ORDER — TOFACITINIB 5 MG/1
5 TABLET, FILM COATED ORAL 2 TIMES DAILY
COMMUNITY
Start: 2022-09-15 | End: 2022-09-24 | Stop reason: SDUPTHER

## 2022-09-22 RX ORDER — DULOXETIN HYDROCHLORIDE 60 MG/1
60 CAPSULE, DELAYED RELEASE ORAL
COMMUNITY
Start: 2022-09-13 | End: 2023-10-29

## 2022-09-22 RX ORDER — ATORVASTATIN CALCIUM 10 MG/1
10 TABLET, FILM COATED ORAL
COMMUNITY
Start: 2022-07-06 | End: 2024-03-11

## 2022-09-22 RX ORDER — CLOPIDOGREL BISULFATE 75 MG/1
75 TABLET ORAL
COMMUNITY
Start: 2022-07-06 | End: 2022-09-24 | Stop reason: SDUPTHER

## 2022-09-22 RX ORDER — TOFACITINIB 5 MG/1
5 TABLET, FILM COATED ORAL 2 TIMES DAILY
COMMUNITY
Start: 2022-09-13 | End: 2023-09-13

## 2022-09-22 RX ORDER — AMLODIPINE BESYLATE 5 MG/1
5 TABLET ORAL
COMMUNITY
Start: 2022-07-06 | End: 2023-10-29

## 2022-09-25 NOTE — PROGRESS NOTES
Subjective:       Patient ID: Nevaeh Howard is a 56 y.o. female.    Chief Complaint: Foot Pain, Callouses, and Skin Problem  Patient presents with complaint p of severe pain under ball of the left foot, area which she usually has a small callus but has progressed in size, swelling and has been very painful. Has a history of amputation digits 2 through 5 left foot. PVD taking Plavix and Xarelto.  Is on pain management takes Cymbalta and neurontin. Pain level left foot 8/10      Past Medical History:   Diagnosis Date    Lung disease     Lupus     RA (rheumatoid arthritis)      Past Surgical History:   Procedure Laterality Date    APPENDECTOMY      BACK SURGERY      CHOLECYSTECTOMY      ENDOMICROSCOPY      HYSTERECTOMY      TOE AMPUTATION       left foot     TONSILLECTOMY       Family History   Problem Relation Age of Onset    Cancer Mother     Heart disease Father     Stroke Father      Social History     Socioeconomic History    Marital status: Single   Tobacco Use    Smoking status: Former     Years: 40.00     Types: Cigarettes    Smokeless tobacco: Never    Tobacco comments:     quit date    Substance and Sexual Activity    Alcohol use: Never    Drug use: Never    Sexual activity: Not Currently       Current Outpatient Medications   Medication Sig Dispense Refill    albuterol (PROVENTIL/VENTOLIN HFA) 90 mcg/actuation inhaler Inhale 2 puffs into the lungs every 6 (six) hours as needed.      amLODIPine (NORVASC) 5 MG tablet Take 5 mg by mouth.      aspirin (ECOTRIN) 81 MG EC tablet Take 81 mg by mouth.      atorvastatin (LIPITOR) 10 MG tablet Take 10 mg by mouth.      clopidogreL (PLAVIX) 75 mg tablet Take 75 mg by mouth.      DULoxetine (CYMBALTA) 60 MG capsule Take 60 mg by mouth.      gabapentin (NEURONTIN) 600 MG tablet Take 600 mg by mouth.      morphine (MS CONTIN) 30 MG 12 hr tablet Take by mouth nightly.      mupirocin (BACTROBAN) 2 % ointment SMARTSI Application Topical 2-3 Times Daily       "oxyCODONE (ROXICODONE) 30 MG Tab       rivaroxaban (XARELTO) 20 mg Tab Take 20 mg by mouth.      tofacitinib (XELJANZ) 5 mg Tab Take 5 mg by mouth 2 (two) times daily.      umeclidinium-vilanteroL (ANORO ELLIPTA) 62.5-25 mcg/actuation DsDv Inhale 1 puff into the lungs.      clindamycin (CLEOCIN) 300 MG capsule Take 300 mg by mouth 3 (three) times daily.      NARCAN 4 mg/actuation Spry 4 mg by Nasal route as needed.       No current facility-administered medications for this visit.     Review of patient's allergies indicates:   Allergen Reactions    Ketorolac Hives     "Makes me break out"       Review of Systems   HENT:  Negative for congestion.    Respiratory:  Negative for cough.    Cardiovascular:  Negative for leg swelling.   Musculoskeletal:  Positive for gait problem.   All other systems reviewed and are negative.    Objective:      Vitals:    09/22/22 1108   BP: (!) 152/89   Pulse: 93   Resp: 18   Weight: 112.9 kg (249 lb)   Height: 5' 7" (1.702 m)     Physical Exam  Vitals and nursing note reviewed.   Constitutional:       General: She is not in acute distress.  Cardiovascular:      Pulses:           Dorsalis pedis pulses are 1+ on the right side and 1+ on the left side.        Posterior tibial pulses are 1+ on the right side and 1+ on the left side.   Abdominal:      General: Abdomen is flat.   Musculoskeletal:         General: Deformity present.      Left foot: Decreased range of motion. Deformity and prominent metatarsal heads present.      Comments: Digital amputation left foot 2-5   Feet:      Right foot:      Skin integrity: Dry skin present.      Left foot:      Skin integrity: Callus (Reviewed pre ulcerative callus, raised, inflammed, very deep, very painful, no skin opening sub 5th met head left), dry skin and fissure present.   Skin:     Capillary Refill: Capillary refill takes more than 3 seconds.      Findings: Lesion present.      Comments: Plantar left foot   Neurological:      General: No " focal deficit present.   Psychiatric:         Mood and Affect: Mood normal.         Behavior: Behavior normal.                             EXAMINATION:  XR FOOT COMPLETE 3 VIEW LEFT     CLINICAL HISTORY:  . Pain in left foot     TECHNIQUE:  AP, lateral, and oblique views of the left foot were performed.     COMPARISON:  05/19/2021.     FINDINGS:  Remote amputation of the 2nd through 5th toes.  No significant soft tissue swelling or soft tissue emphysema within the amputation bed.     Moderate hallux valgus deformity with mild degenerative osteoarthrosis of the 1st MTP joint.  Tarsal bones intact.  Normal tarsometatarsal alignment.  Small dorsal calcaneal spur.     Impression:     1. Moderate hallux valgus deformity with mild degenerative osteoarthrosis.  2. Prior amputation of the 2nd through 5th toes.        Electronically signed by: Lewis Rangel  Date:                                            09/22/2022       Assessment:       1. Bursitis of left foot    2. Acquired deformity joint foot, left    3. Pre-ulcerative calluses - Left Foot    4. Left foot pain    5. History of amputation of left foot    6. Rheumatoid arthritis, involving unspecified site, unspecified whether rheumatoid factor present        Plan:           XRAY COMPLETE LEFT FOOT      Reviewed x-rays, no changes  Advised patient area of callus has progressed significantly with repetitive pressure under the area the 5th met head she has developed bursitis/inflammation underlying the callus which most likely extends down to the bone.  She also has some neuritis, inflammation of the nerve in this location.  Callus was debrided extensively, no discoloration or skin opening  Explained to patient the best way to resolve inflammation, swelling and pain is to stay off her feet.  Extensive debridement of pre ulcerative callus left foot.  Debridement distal amputation callus  We discussed care and maintenance of calluses, dry skin including mild dry skin  right foot  Instructed patient to apply lotion once each evening, allow it to thoroughly absorbed into the skin and dry before applying socks and shoes therefore applying before bed is the best time  Due to history of PVD do not use Ice  Can use over-the-counter Voltaren gel as directed, the must be thoroughly absorbed into the skin and dry before applying socks and shoes.  Explained it is important to prevent moisture  Cannot take NSAIDs due to Plavix and Xarelto therapy   Advised patient again she needs to stay off her feet to heal this area  A lengthy discussion regarding appropriate shoes reviewed diabetic shoes with a filler and we discussed [place] tennis shoes  High risk for complications  Patient was in understanding and agreement with treatment plan.  Reviewed last xrays   I counseled the patient on their conditions, implications and medical management.  Instructed patient to contact the office with any changes, questions, concerns, worsening of symptoms.   Total face-to-face time, exam, assessment, treatment, discussion, documentation 30 minutes, more than half this time spent on consultation and coordination of care.  Follow up 4 weeks      This note was created using [employer] voice recognition software that occasionally misinterpreted phrases or words.

## 2022-10-20 ENCOUNTER — OFFICE VISIT (OUTPATIENT)
Dept: PODIATRY | Facility: CLINIC | Age: 57
End: 2022-10-20
Payer: MEDICAID

## 2022-10-20 VITALS
DIASTOLIC BLOOD PRESSURE: 78 MMHG | SYSTOLIC BLOOD PRESSURE: 118 MMHG | BODY MASS INDEX: 39.39 KG/M2 | RESPIRATION RATE: 18 BRPM | WEIGHT: 251 LBS | HEIGHT: 67 IN | HEART RATE: 91 BPM

## 2022-10-20 DIAGNOSIS — M21.962 ACQUIRED DEFORMITY JOINT FOOT, LEFT: ICD-10-CM

## 2022-10-20 DIAGNOSIS — L85.3 DRY SKIN: ICD-10-CM

## 2022-10-20 DIAGNOSIS — Z89.432 HISTORY OF AMPUTATION OF LEFT FOOT: ICD-10-CM

## 2022-10-20 DIAGNOSIS — L84 PRE-ULCERATIVE CALLUSES: ICD-10-CM

## 2022-10-20 DIAGNOSIS — G57.93 NEUROPATHIC PAIN OF BOTH FEET: ICD-10-CM

## 2022-10-20 DIAGNOSIS — M06.379: Primary | ICD-10-CM

## 2022-10-20 DIAGNOSIS — B35.3 TINEA PEDIS OF BOTH FEET: ICD-10-CM

## 2022-10-20 PROCEDURE — 1159F MED LIST DOCD IN RCRD: CPT | Mod: CPTII,,, | Performed by: PODIATRIST

## 2022-10-20 PROCEDURE — 99214 OFFICE O/P EST MOD 30 MIN: CPT | Mod: PBBFAC | Performed by: PODIATRIST

## 2022-10-20 PROCEDURE — 1159F PR MEDICATION LIST DOCUMENTED IN MEDICAL RECORD: ICD-10-PCS | Mod: CPTII,,, | Performed by: PODIATRIST

## 2022-10-20 PROCEDURE — 3078F DIAST BP <80 MM HG: CPT | Mod: CPTII,,, | Performed by: PODIATRIST

## 2022-10-20 PROCEDURE — 99214 PR OFFICE/OUTPT VISIT, EST, LEVL IV, 30-39 MIN: ICD-10-PCS | Mod: S$PBB,,, | Performed by: PODIATRIST

## 2022-10-20 PROCEDURE — 3074F PR MOST RECENT SYSTOLIC BLOOD PRESSURE < 130 MM HG: ICD-10-PCS | Mod: CPTII,,, | Performed by: PODIATRIST

## 2022-10-20 PROCEDURE — 99999 PR PBB SHADOW E&M-EST. PATIENT-LVL IV: ICD-10-PCS | Mod: PBBFAC,,, | Performed by: PODIATRIST

## 2022-10-20 PROCEDURE — 99214 OFFICE O/P EST MOD 30 MIN: CPT | Mod: S$PBB,,, | Performed by: PODIATRIST

## 2022-10-20 PROCEDURE — 3078F PR MOST RECENT DIASTOLIC BLOOD PRESSURE < 80 MM HG: ICD-10-PCS | Mod: CPTII,,, | Performed by: PODIATRIST

## 2022-10-20 PROCEDURE — 3074F SYST BP LT 130 MM HG: CPT | Mod: CPTII,,, | Performed by: PODIATRIST

## 2022-10-20 PROCEDURE — 99999 PR PBB SHADOW E&M-EST. PATIENT-LVL IV: CPT | Mod: PBBFAC,,, | Performed by: PODIATRIST

## 2022-10-20 RX ORDER — CLOTRIMAZOLE AND BETAMETHASONE DIPROPIONATE 10; .64 MG/G; MG/G
CREAM TOPICAL 2 TIMES DAILY
Qty: 45 G | Refills: 1 | Status: SHIPPED | OUTPATIENT
Start: 2022-10-20 | End: 2023-10-29

## 2022-10-22 NOTE — PROGRESS NOTES
Subjective:       Patient ID: Nevaeh Howard is a 57 y.o. female.    Chief Complaint: Foot Problem, Foot Pain, and Follow-up  Patient presents for follow-up acquired deformity left foot following amputation digits 2 through 5, follow-up bursitis, pre ulcerative callus left foot.  Patient relates this area has improved, decreased swelling and pain but still very sore painful area under where the 5th digit was on the left foot and she has developed a very sore rash on the inside of the right arch.  Denies drainage.  Patient has also just noticed a lump under the 3rd digit right foot, pain in this area when walking. Pain level 8/10  PVD taking Plavix and Xarelto.    Pain management takes Cymbalta and neurontin      Past Medical History:   Diagnosis Date    Hyperlipidemia     Hypertension     Lung disease     Lupus     RA (rheumatoid arthritis)      Past Surgical History:   Procedure Laterality Date    APPENDECTOMY      BACK SURGERY      CHOLECYSTECTOMY      ENDOMICROSCOPY      HYSTERECTOMY      TOE AMPUTATION      2345 left foot     TONSILLECTOMY       Family History   Problem Relation Age of Onset    Cancer Mother     Heart disease Father     Stroke Father      Social History     Socioeconomic History    Marital status: Single   Tobacco Use    Smoking status: Former     Years: 40.00     Types: Cigarettes    Smokeless tobacco: Never    Tobacco comments:     quit date 2018   Substance and Sexual Activity    Alcohol use: Never    Drug use: Never    Sexual activity: Not Currently       Current Outpatient Medications   Medication Sig Dispense Refill    albuterol (PROVENTIL/VENTOLIN HFA) 90 mcg/actuation inhaler Inhale 2 puffs into the lungs every 6 (six) hours as needed.      amLODIPine (NORVASC) 5 MG tablet Take 5 mg by mouth.      aspirin (ECOTRIN) 81 MG EC tablet Take 81 mg by mouth.      atorvastatin (LIPITOR) 10 MG tablet Take 10 mg by mouth.      clopidogreL (PLAVIX) 75 mg tablet Take 75 mg by mouth.      DULoxetine  "(CYMBALTA) 60 MG capsule Take 60 mg by mouth.      gabapentin (NEURONTIN) 600 MG tablet Take 600 mg by mouth.      morphine (MS CONTIN) 30 MG 12 hr tablet Take by mouth nightly.      NARCAN 4 mg/actuation Spry 4 mg by Nasal route as needed.      oxyCODONE (ROXICODONE) 30 MG Tab       rivaroxaban (XARELTO) 20 mg Tab Take 20 mg by mouth.      tofacitinib (XELJANZ) 5 mg Tab Take 5 mg by mouth 2 (two) times daily.      clindamycin (CLEOCIN) 300 MG capsule Take 300 mg by mouth 3 (three) times daily.      clotrimazole-betamethasone 1-0.05% (LOTRISONE) cream Apply topically 2 (two) times daily. 45 g 1    mupirocin (BACTROBAN) 2 % ointment SMARTSI Application Topical 2-3 Times Daily      umeclidinium-vilanteroL (ANORO ELLIPTA) 62.5-25 mcg/actuation DsDv Inhale 1 puff into the lungs.       No current facility-administered medications for this visit.     Review of patient's allergies indicates:   Allergen Reactions    Ketorolac Hives     "Makes me break out"       Review of Systems   HENT:  Negative for congestion.    Respiratory:  Negative for cough.    Cardiovascular:  Negative for leg swelling.   Musculoskeletal:  Positive for gait problem.   All other systems reviewed and are negative.    Objective:      Vitals:    10/20/22 1308   BP: 118/78   Pulse: 91   Resp: 18   Weight: 113.9 kg (251 lb)   Height: 5' 7" (1.702 m)     Physical Exam  Vitals and nursing note reviewed.   Constitutional:       General: She is not in acute distress.  Cardiovascular:      Pulses:           Dorsalis pedis pulses are 1+ on the right side and 1+ on the left side.        Posterior tibial pulses are 1+ on the right side and 1+ on the left side.   Abdominal:      General: Abdomen is flat.   Musculoskeletal:         General: Deformity present.      Left foot: Decreased range of motion. Deformity and prominent metatarsal heads present.      Comments: Digital amputation left foot 2-5   Feet:      Right foot:      Skin integrity: Dry skin (arch " right foot distal stump left foot, fungal involvement) present. No skin breakdown or erythema.      Left foot:      Skin integrity: Callus (pre ulcerative callus sub 5th met head left, mildly improved. Callus distal stump amputated 2-5, no discoloration) and dry skin present. No skin breakdown or erythema.   Skin:     General: Skin is dry.      Capillary Refill: Capillary refill takes more than 3 seconds.      Findings: Rash present.      Comments: Small rheumatoid nodule plantar 3rd digit right foot.  Rash, dry skin right arch, large callus left foot   Neurological:      General: No focal deficit present.   Psychiatric:         Mood and Affect: Mood normal.         Behavior: Behavior normal.       EXAMINATION:  XR FOOT COMPLETE 3 VIEW LEFT     CLINICAL HISTORY:  Pain in left foot  TECHNIQUE:  AP, lateral, and oblique views of the left foot were performed.  COMPARISON:  05/19/2021.  FINDINGS:  Remote amputation of the 2nd through 5th toes.  No significant soft tissue swelling or soft tissue emphysema within the amputation bed.     Moderate hallux valgus deformity with mild degenerative osteoarthrosis of the 1st MTP joint.  Tarsal bones intact.  Normal tarsometatarsal alignment.  Small dorsal calcaneal spur.     Impression:  1. Moderate hallux valgus deformity with mild degenerative osteoarthrosis.  2. Prior amputation of the 2nd through 5th toes.     Electronically signed by: Lewis Rangel  Date:                                            09/22/2022                                                 Assessment:       1. Rheumatoid nodule, unspecified ankle and foot - Right Foot    2. Acquired deformity joint foot, left    3. History of amputation of left foot    4. Neuropathic pain of both feet    5. Pre-ulcerative calluses - Left Foot    6. Tinea pedis of both feet    7. Dry skin        Plan:           CLOTRIMAZOLE/BETAMETHASONE CREAM APPLY 1-2 X DAILY TO AFFECTED AREAS OF FEET    Reviewed small rheumatoid nodule  under the 3rd digit right foot.  Advised patient on appropriate shoes, wide toe box, keeping pressure off this area to avoid complications, check daily  We had a lengthy discussion regarding neuropathy which causes both chronic pain and lack of sensation in feet, reviewed medications she is taking for this condition  Bursitis involving pre ulcerative callus 5th met head region left foot has improved significantly.  Following debridement of this area change in color of the center of the callus has improved.  Entire callus area distal left foot was debrided with no skin openings, no complications at this time but patient remains at high risk.  Area debrided right arch, again no areas of skin opening, no sign of infection.  Had a lengthy discussion with patient regarding better care and maintenance of skin since she is at high risk for complications.  We reviewed fungal involvement of rash right arch.  Reviewed better care of dry thick skin on heels  Xeroform was applied to the right arch in both heels today, showed patient how to apply at night only, leave open during the day.  In the morning apply clotrimazole/betamethasone to area of dry skin in the right arch in the end of the left foot.  Make sure cream is thoroughly absorbed into the skin and dry before applying socks and shoes.  Advised patient do not wrap anything around the area on the end of the left foot, overall this area does need to stay dry.  Explained if any area becomes too moist to skin can breakdown and will become a open sore.  She is to provide light moisture 2 areas only, do not leave covered all day, areas do need to stay clean and dry  Reviewed potential complications due to previous foot amputation and due to history of PVD   Check feet daily, contact the office immediately with any changes  Patient was in understanding and agreement with treatment plan.  I counseled the patient on their conditions, implications and medical  management.  Instructed patient to contact the office with any changes, questions, concerns, worsening of symptoms.   Total face-to-face time, exam, assessment, treatment, discussion, documentation 30 minutes, more than half this time spent on consultation and coordination of care.  Follow up 4 weeks      This note was created using [employer] voice recognition software that occasionally misinterpreted phrases or words.

## 2023-10-27 ENCOUNTER — OFFICE VISIT (OUTPATIENT)
Dept: PODIATRY | Facility: CLINIC | Age: 58
End: 2023-10-27
Payer: MEDICAID

## 2023-10-27 VITALS
SYSTOLIC BLOOD PRESSURE: 111 MMHG | DIASTOLIC BLOOD PRESSURE: 67 MMHG | RESPIRATION RATE: 18 BRPM | BODY MASS INDEX: 35.31 KG/M2 | HEIGHT: 67 IN | HEART RATE: 88 BPM | WEIGHT: 225 LBS

## 2023-10-27 DIAGNOSIS — Z89.422 HISTORY OF AMPUTATION OF LESSER TOE OF LEFT FOOT: ICD-10-CM

## 2023-10-27 DIAGNOSIS — M21.962 ACQUIRED DEFORMITY JOINT FOOT, LEFT: ICD-10-CM

## 2023-10-27 DIAGNOSIS — M77.52 BURSITIS OF LEFT FOOT: Primary | ICD-10-CM

## 2023-10-27 DIAGNOSIS — G57.93 NEUROPATHIC PAIN OF BOTH FEET: ICD-10-CM

## 2023-10-27 DIAGNOSIS — L85.3 DRY SKIN: ICD-10-CM

## 2023-10-27 DIAGNOSIS — M77.42 METATARSALGIA, LEFT FOOT: ICD-10-CM

## 2023-10-27 DIAGNOSIS — L84 PRE-ULCERATIVE CALLUSES: ICD-10-CM

## 2023-10-27 PROCEDURE — 3074F PR MOST RECENT SYSTOLIC BLOOD PRESSURE < 130 MM HG: ICD-10-PCS | Mod: CPTII,,, | Performed by: PODIATRIST

## 2023-10-27 PROCEDURE — 99999 PR PBB SHADOW E&M-EST. PATIENT-LVL IV: ICD-10-PCS | Mod: PBBFAC,,, | Performed by: PODIATRIST

## 2023-10-27 PROCEDURE — 1159F PR MEDICATION LIST DOCUMENTED IN MEDICAL RECORD: ICD-10-PCS | Mod: CPTII,,, | Performed by: PODIATRIST

## 2023-10-27 PROCEDURE — 1160F RVW MEDS BY RX/DR IN RCRD: CPT | Mod: CPTII,,, | Performed by: PODIATRIST

## 2023-10-27 PROCEDURE — 1159F MED LIST DOCD IN RCRD: CPT | Mod: CPTII,,, | Performed by: PODIATRIST

## 2023-10-27 PROCEDURE — 3078F PR MOST RECENT DIASTOLIC BLOOD PRESSURE < 80 MM HG: ICD-10-PCS | Mod: CPTII,,, | Performed by: PODIATRIST

## 2023-10-27 PROCEDURE — 99215 PR OFFICE/OUTPT VISIT, EST, LEVL V, 40-54 MIN: ICD-10-PCS | Mod: S$PBB,,, | Performed by: PODIATRIST

## 2023-10-27 PROCEDURE — 3078F DIAST BP <80 MM HG: CPT | Mod: CPTII,,, | Performed by: PODIATRIST

## 2023-10-27 PROCEDURE — 99214 OFFICE O/P EST MOD 30 MIN: CPT | Mod: PBBFAC | Performed by: PODIATRIST

## 2023-10-27 PROCEDURE — 3008F PR BODY MASS INDEX (BMI) DOCUMENTED: ICD-10-PCS | Mod: CPTII,,, | Performed by: PODIATRIST

## 2023-10-27 PROCEDURE — 99999 PR PBB SHADOW E&M-EST. PATIENT-LVL IV: CPT | Mod: PBBFAC,,, | Performed by: PODIATRIST

## 2023-10-27 PROCEDURE — 1160F PR REVIEW ALL MEDS BY PRESCRIBER/CLIN PHARMACIST DOCUMENTED: ICD-10-PCS | Mod: CPTII,,, | Performed by: PODIATRIST

## 2023-10-27 PROCEDURE — 99215 OFFICE O/P EST HI 40 MIN: CPT | Mod: S$PBB,,, | Performed by: PODIATRIST

## 2023-10-27 PROCEDURE — 3074F SYST BP LT 130 MM HG: CPT | Mod: CPTII,,, | Performed by: PODIATRIST

## 2023-10-27 PROCEDURE — 3008F BODY MASS INDEX DOCD: CPT | Mod: CPTII,,, | Performed by: PODIATRIST

## 2023-10-27 RX ORDER — ONDANSETRON 4 MG/1
4 TABLET, FILM COATED ORAL EVERY 6 HOURS PRN
COMMUNITY
Start: 2023-09-14

## 2023-10-27 RX ORDER — BENZONATATE 100 MG/1
100 CAPSULE ORAL 3 TIMES DAILY
COMMUNITY
Start: 2023-10-17 | End: 2024-03-11

## 2023-10-27 RX ORDER — NINTEDANIB 100 MG/1
CAPSULE ORAL
COMMUNITY
Start: 2023-10-16 | End: 2024-03-11 | Stop reason: SDUPTHER

## 2023-10-27 RX ORDER — TIOTROPIUM BROMIDE INHALATION SPRAY 3.12 UG/1
SPRAY, METERED RESPIRATORY (INHALATION)
COMMUNITY
Start: 2023-05-22 | End: 2023-10-29

## 2023-10-27 RX ORDER — TOFACITINIB 11 MG/1
1 TABLET, FILM COATED, EXTENDED RELEASE ORAL EVERY MORNING
COMMUNITY
Start: 2023-07-03 | End: 2023-10-29

## 2023-10-27 RX ORDER — SULFASALAZINE 500 MG/1
1000 TABLET, DELAYED RELEASE ORAL
COMMUNITY
Start: 2023-10-18 | End: 2023-10-29

## 2023-10-27 RX ORDER — POLYETHYLENE GLYCOL 3350 17 G/17G
17 POWDER, FOR SOLUTION ORAL
COMMUNITY
Start: 2023-01-17 | End: 2024-03-11

## 2023-10-27 RX ORDER — NINTEDANIB 150 MG/1
CAPSULE ORAL
COMMUNITY
Start: 2023-09-11 | End: 2023-10-29 | Stop reason: ALTCHOICE

## 2023-10-27 RX ORDER — BUDESONIDE AND FORMOTEROL FUMARATE DIHYDRATE 160; 4.5 UG/1; UG/1
2 AEROSOL RESPIRATORY (INHALATION) 2 TIMES DAILY
COMMUNITY
Start: 2023-07-19 | End: 2023-10-29

## 2023-10-27 RX ORDER — METHYLPREDNISOLONE 4 MG/1
TABLET ORAL
COMMUNITY
Start: 2022-12-30 | End: 2023-10-29

## 2023-10-27 RX ORDER — IPRATROPIUM BROMIDE AND ALBUTEROL SULFATE 2.5; .5 MG/3ML; MG/3ML
3 SOLUTION RESPIRATORY (INHALATION)
COMMUNITY
Start: 2022-12-30

## 2023-10-27 RX ORDER — PROMETHAZINE HYDROCHLORIDE AND DEXTROMETHORPHAN HYDROBROMIDE 6.25; 15 MG/5ML; MG/5ML
5 SYRUP ORAL 4 TIMES DAILY PRN
COMMUNITY
Start: 2022-12-30 | End: 2023-10-29

## 2023-10-27 RX ORDER — ATORVASTATIN CALCIUM 40 MG/1
40 TABLET, FILM COATED ORAL
COMMUNITY
Start: 2023-05-24 | End: 2023-10-29

## 2023-10-27 RX ORDER — BUDESONIDE AND FORMOTEROL FUMARATE DIHYDRATE 160; 4.5 UG/1; UG/1
2 AEROSOL RESPIRATORY (INHALATION)
COMMUNITY
Start: 2023-05-03 | End: 2023-10-29

## 2023-10-27 RX ORDER — FERROUS SULFATE 325(65) MG
TABLET, DELAYED RELEASE (ENTERIC COATED) ORAL
COMMUNITY
Start: 2023-10-17

## 2023-10-29 NOTE — PROGRESS NOTES
Subjective:       Patient ID: Nevaeh Howard is a 58 y.o. female.    Chief Complaint: Callouses, Foot Pain, and Skin Problem  Patient presents with complaint of increased pain left foot, calluses which she has developed secondary to amputation digits 2 through 5.  We have not seen patient in a year.  She relates pain through the front of this left foot has been worse than usual.  She relates always some discomfort but pain over the last few months has escalated to a pain level of 7/10.  States she does try to work on these areas keeping him smooth and apply lotion which helps but not over the last few months.  She confirms difficulty wearing an appropriate shoe, the way the big toe has drifted over she can not get into a tennis shoe.  Relates even though she applies lotion it is not penetrating the skin on her heels.  Taking gabapentin for neuropathic pain.  On pain management through her PCP.  Has history of RA, no diabetes.    Past Medical History:   Diagnosis Date    Hyperlipidemia     Hypertension     Lung disease     Lupus     RA (rheumatoid arthritis)      Past Surgical History:   Procedure Laterality Date    APPENDECTOMY      BACK SURGERY      CHOLECYSTECTOMY      ENDOMICROSCOPY      HYSTERECTOMY      TOE AMPUTATION      2345 left foot     TONSILLECTOMY       Family History   Problem Relation Age of Onset    Cancer Mother     Heart disease Father     Stroke Father      Social History     Socioeconomic History    Marital status: Single   Tobacco Use    Smoking status: Former     Types: Cigarettes    Smokeless tobacco: Never    Tobacco comments:     quit date 2018   Substance and Sexual Activity    Alcohol use: Never    Drug use: Never    Sexual activity: Not Currently       Current Outpatient Medications   Medication Sig Dispense Refill    albuterol (PROVENTIL/VENTOLIN HFA) 90 mcg/actuation inhaler Inhale 2 puffs into the lungs every 6 (six) hours as needed.      albuterol-ipratropium (DUO-NEB) 2.5 mg-0.5 mg/3  "mL nebulizer solution 3 mLs.      aspirin (ECOTRIN) 81 MG EC tablet Take 81 mg by mouth.      atorvastatin (LIPITOR) 10 MG tablet Take 10 mg by mouth.      benzonatate (TESSALON) 100 MG capsule Take 100 mg by mouth 3 (three) times daily.      clopidogreL (PLAVIX) 75 mg tablet Take 75 mg by mouth.      ferrous sulfate 325 (65 FE) MG EC tablet Take by mouth.      gabapentin (NEURONTIN) 600 MG tablet Take 600 mg by mouth.      OFEV 100 mg Cap Take by mouth.      ondansetron (ZOFRAN) 4 MG tablet Take 4 mg by mouth every 6 (six) hours as needed.      oxyCODONE (ROXICODONE) 30 MG Tab       polyethylene glycol (GLYCOLAX) 17 gram PwPk Take 17 g by mouth.      mupirocin (BACTROBAN) 2 % ointment SMARTSI Application Topical 2-3 Times Daily      NARCAN 4 mg/actuation Spry 4 mg by Nasal route as needed.      OXYGEN-AIR DELIVERY SYSTEMS MISC Oxygen for home use 3L/min via nasal cannula until discontinued Indications: lung failure causing loss of breath, lung tissue problem       No current facility-administered medications for this visit.     Review of patient's allergies indicates:   Allergen Reactions    Ketorolac Hives     "Makes me break out"       Review of Systems   Cardiovascular:  Negative for leg swelling.   Musculoskeletal:  Positive for gait problem.   All other systems reviewed and are negative.      Objective:      Vitals:    10/27/23 1132   BP: 111/67   Pulse: 88   Resp: 18   Weight: 102.1 kg (225 lb)   Height: 5' 7" (1.702 m)     Physical Exam  Vitals and nursing note reviewed.   Constitutional:       General: She is not in acute distress.     Appearance: Normal appearance.   Cardiovascular:      Pulses:           Dorsalis pedis pulses are 1+ on the right side and 1+ on the left side.        Posterior tibial pulses are 1+ on the right side and 1+ on the left side.   Abdominal:      General: Abdomen is flat.   Musculoskeletal:         General: Tenderness and deformity present.      Left foot: Decreased range of " motion. Deformity and prominent metatarsal heads present.      Comments: Pain an underlying bursitis sub 3rd and 5th met head left foot as plantar flexed metatarsals put repetitive pressure on these areas with the development of chronic pre ulcerative calluses.  Digital amputation left foot 2-5   Feet:      Right foot:      Skin integrity: Dry skin (Severe dry skin heels) present. No skin breakdown or erythema.      Left foot:      Skin integrity: Callus (pre ulcerative callusmedial IPJ hallux, sub 2nd and 5th met head left) and dry skin present. No skin breakdown or erythema.   Skin:     Capillary Refill: Capillary refill takes more than 3 seconds.   Neurological:      General: No focal deficit present.      Mental Status: She is alert.   Psychiatric:         Mood and Affect: Mood normal.         Behavior: Behavior normal.         EXAMINATION:  XR FOOT COMPLETE 3 VIEW LEFT     CLINICAL HISTORY:  Pain in left foot  TECHNIQUE:  AP, lateral, and oblique views of the left foot were performed.  COMPARISON:  05/19/2021.  FINDINGS:  Remote amputation of the 2nd through 5th toes.  No significant soft tissue swelling or soft tissue emphysema within the amputation bed.     Moderate hallux valgus deformity with mild degenerative osteoarthrosis of the 1st MTP joint.  Tarsal bones intact.  Normal tarsometatarsal alignment.  Small dorsal calcaneal spur.     Impression:  1. Moderate hallux valgus deformity with mild degenerative osteoarthrosis.  2. Prior amputation of the 2nd through 5th toes.     Electronically signed by: Lewis Rangel  Date:                                            09/22/2022                               Assessment:       1. Bursitis of left foot    2. Metatarsalgia, left foot    3. History of amputation of lesser toe of left foot    4. Acquired deformity joint foot, left    5. Neuropathic pain of both feet    6. Pre-ulcerative calluses - Left Foot    7. Dry skin          Plan:         Reviewed with patient  recurring bursitis, inflammation which develops between the callus and the remaining distal portion of the bone in these locations on the ball of the left foot.  Patient does understand through previous visit with Dr. Al Blevins, on x-rays the way the bones are shaped lens to pressure on the bottom of the foot when pushing forward.  Explained to patient with the absence of her toes which helped her to push forward, the ball of the foot has to do that job for her when walking.  Explained the big toe is not able to perform that function properly due to the way it is deviated.  Patient understands all the pressure she uses to push-off is mainly put on the area of the 2 calluses on the ball of the left foot.  Explained to patient this puts her at risk for developing ulcer under these areas.  Advised patient even in the absence of pain these areas need to be treated on a regular basis, at least every 3 months  Preulcerative calluses were debrided, no skin opening or discoloration underneath, these areas included medial aspect of the left hallux, sub 2nd and 5th met head left foot  Had a lengthy discussion regarding topical medication to treat pain and inflammation, utilize over-the-counter Voltaren gel as directed applying at least 3 times daily.  Advised patient it can take 2 weeks for topical medication to work in this area and during this period of time she needs to start slowly transitioning into a different type of tennis shoe.  Advised patient she may need a size larger, she may need to use a meds extra extra wide, she needs to start to experiment with just a cheap, [employer] brand type tennis shoe to start adjusting gradually to cushion this area or it will continue to recur.  Other option is to purchase a diabetic shoe but this can be expensive  Reviewed extent of dry skin on the heels and advised patient this will start to crack and bleed, we discussed using tea tree oil, coconut oil, oatmeal lotion.   Advised patient more oily substance most likely will be more effective, it is recommended to be heavily applied and apply socks before bed  Explained to patient it is very important that she takes good care of skin and nails to prevent complications, thickness of nail reduced.  Advised patient I would prefer she file the nails and avoid trimming with a sharp instrument  Check feet daily, contact the office immediately with any changes  Patient was in understanding and agreement with treatment plan.  I counseled the patient on their conditions, implications and medical management.  Instructed patient to contact the office with any changes, questions, concerns, worsening of symptoms.   Total face-to-face time, exam, assessment, treatment, discussion, documentation 40 minutes, more than half this time spent on consultation and coordination of care.  Follow up 3 months      This note was created using [employer] voice recognition software that occasionally misinterpreted phrases or words.

## 2024-02-20 ENCOUNTER — OFFICE VISIT (OUTPATIENT)
Dept: PODIATRY | Facility: CLINIC | Age: 59
End: 2024-02-20
Payer: MEDICAID

## 2024-02-20 VITALS
SYSTOLIC BLOOD PRESSURE: 112 MMHG | BODY MASS INDEX: 35.31 KG/M2 | DIASTOLIC BLOOD PRESSURE: 70 MMHG | HEART RATE: 97 BPM | HEIGHT: 67 IN | OXYGEN SATURATION: 95 % | WEIGHT: 225 LBS

## 2024-02-20 DIAGNOSIS — M06.9 RHEUMATOID ARTHRITIS, INVOLVING UNSPECIFIED SITE, UNSPECIFIED WHETHER RHEUMATOID FACTOR PRESENT: ICD-10-CM

## 2024-02-20 DIAGNOSIS — I73.9 PERIPHERAL VASCULAR DISEASE: ICD-10-CM

## 2024-02-20 DIAGNOSIS — Z89.432 HISTORY OF AMPUTATION OF LEFT FOOT: ICD-10-CM

## 2024-02-20 DIAGNOSIS — M19.079 OSTEOARTHRITIS OF ANKLE AND FOOT, UNSPECIFIED LATERALITY: ICD-10-CM

## 2024-02-20 DIAGNOSIS — S91.301A WOUND OF RIGHT FOOT: Primary | ICD-10-CM

## 2024-02-20 DIAGNOSIS — L97.521 SKIN ULCER OF LEFT FOOT, LIMITED TO BREAKDOWN OF SKIN: ICD-10-CM

## 2024-02-20 DIAGNOSIS — M21.6X9 PLANTARFLEXION DEFORMITY OF FOOT: ICD-10-CM

## 2024-02-20 PROCEDURE — 3074F SYST BP LT 130 MM HG: CPT | Mod: CPTII,,, | Performed by: PODIATRIST

## 2024-02-20 PROCEDURE — 99999 PR PBB SHADOW E&M-EST. PATIENT-LVL IV: CPT | Mod: PBBFAC,,, | Performed by: PODIATRIST

## 2024-02-20 PROCEDURE — 1160F RVW MEDS BY RX/DR IN RCRD: CPT | Mod: CPTII,,, | Performed by: PODIATRIST

## 2024-02-20 PROCEDURE — 1159F MED LIST DOCD IN RCRD: CPT | Mod: CPTII,,, | Performed by: PODIATRIST

## 2024-02-20 PROCEDURE — 99214 OFFICE O/P EST MOD 30 MIN: CPT | Mod: PBBFAC,PN | Performed by: PODIATRIST

## 2024-02-20 PROCEDURE — 3078F DIAST BP <80 MM HG: CPT | Mod: CPTII,,, | Performed by: PODIATRIST

## 2024-02-20 PROCEDURE — 3008F BODY MASS INDEX DOCD: CPT | Mod: CPTII,,, | Performed by: PODIATRIST

## 2024-02-20 PROCEDURE — 99214 OFFICE O/P EST MOD 30 MIN: CPT | Mod: S$PBB,,, | Performed by: PODIATRIST

## 2024-02-20 RX ORDER — IPRATROPIUM BROMIDE AND ALBUTEROL SULFATE 2.5; .5 MG/3ML; MG/3ML
3 SOLUTION RESPIRATORY (INHALATION)
COMMUNITY
Start: 2023-11-07 | End: 2024-03-11 | Stop reason: SDUPTHER

## 2024-02-20 RX ORDER — TOFACITINIB 10 MG/1
10 TABLET, FILM COATED ORAL
COMMUNITY
Start: 2023-03-01 | End: 2024-03-11

## 2024-02-20 RX ORDER — SULFASALAZINE 500 MG/1
1000 TABLET, DELAYED RELEASE ORAL
COMMUNITY
Start: 2024-02-07 | End: 2024-06-06

## 2024-02-20 RX ORDER — TIOTROPIUM BROMIDE INHALATION SPRAY 3.12 UG/1
2 SPRAY, METERED RESPIRATORY (INHALATION)
COMMUNITY
Start: 2023-04-20

## 2024-02-20 RX ORDER — TOFACITINIB 11 MG/1
11 TABLET, FILM COATED, EXTENDED RELEASE ORAL
COMMUNITY
Start: 2023-05-01 | End: 2024-03-11

## 2024-02-20 RX ORDER — OXYCODONE HYDROCHLORIDE 30 MG/1
30 TABLET ORAL
COMMUNITY
Start: 2023-10-17

## 2024-02-20 RX ORDER — NINTEDANIB 100 MG/1
100 CAPSULE ORAL
COMMUNITY
Start: 2023-09-14

## 2024-02-20 RX ORDER — SULFAMETHOXAZOLE AND TRIMETHOPRIM 800; 160 MG/1; MG/1
1 TABLET ORAL 2 TIMES DAILY
Qty: 28 TABLET | Refills: 0 | Status: SHIPPED | OUTPATIENT
Start: 2024-02-20 | End: 2024-03-05

## 2024-02-20 RX ORDER — ATORVASTATIN CALCIUM 40 MG/1
40 TABLET, FILM COATED ORAL
COMMUNITY

## 2024-02-20 RX ORDER — AMLODIPINE BESYLATE 5 MG/1
5 TABLET ORAL
COMMUNITY
Start: 2024-01-10

## 2024-02-20 RX ORDER — VIT C/E/ZN/COPPR/LUTEIN/ZEAXAN 250MG-90MG
1000 CAPSULE ORAL
COMMUNITY

## 2024-02-24 NOTE — PROGRESS NOTES
Subjective:       Patient ID: Nevaeh Howard is a 58 y.o. female.    Chief Complaint: Foot Ulcer (left)  Patient presents with complaint of increased pain left foot, calluses which she has developed secondary to amputation digits 2 through 5.  We have not seen the patient in 5 months.   She relates pain through the front of this left foot has been worse than usual.  She relates always some discomfort but pain over the last few months has escalated to a pain level of 7/10.  States she does try to work on these areas keeping him smooth and apply lotion which helps but not over the last few months.  She confirms difficulty wearing an appropriate shoe, the way the big toe has drifted over she can not get into a tennis shoe.  Relates even though she applies lotion it is not penetrating the skin on her heels.  Taking gabapentin for neuropathic pain.  On pain management through her PCP.  Has history of RA, no diabetes.    Past Medical History:   Diagnosis Date    Hyperlipidemia     Hypertension     Lung disease     Lupus     RA (rheumatoid arthritis)      Past Surgical History:   Procedure Laterality Date    APPENDECTOMY      BACK SURGERY      CHOLECYSTECTOMY      ENDOMICROSCOPY      HYSTERECTOMY      TOE AMPUTATION      2345 left foot     TONSILLECTOMY       Family History   Problem Relation Age of Onset    Cancer Mother     Heart disease Father     Stroke Father      Social History     Socioeconomic History    Marital status: Single   Tobacco Use    Smoking status: Former     Types: Cigarettes    Smokeless tobacco: Never    Tobacco comments:     quit date 2018   Substance and Sexual Activity    Alcohol use: Never    Drug use: Never    Sexual activity: Not Currently       Current Outpatient Medications   Medication Sig Dispense Refill    albuterol (PROVENTIL/VENTOLIN HFA) 90 mcg/actuation inhaler Inhale 2 puffs into the lungs every 6 (six) hours as needed.      albuterol-ipratropium (DUO-NEB) 2.5 mg-0.5 mg/3 mL nebulizer  "solution 3 mLs.      albuterol-ipratropium (DUO-NEB) 2.5 mg-0.5 mg/3 mL nebulizer solution 3 mLs.      amLODIPine (NORVASC) 5 MG tablet Take 5 mg by mouth.      aspirin (ECOTRIN) 81 MG EC tablet Take 81 mg by mouth.      atorvastatin (LIPITOR) 10 MG tablet Take 10 mg by mouth.      atorvastatin (LIPITOR) 40 MG tablet Take 40 mg by mouth.      benzonatate (TESSALON) 100 MG capsule Take 100 mg by mouth 3 (three) times daily.      cholecalciferol, vitamin D3, (VITAMIN D3) 25 mcg (1,000 unit) capsule Take 1,000 Units by mouth.      clopidogreL (PLAVIX) 75 mg tablet Take 75 mg by mouth.      ferrous sulfate 325 (65 FE) MG EC tablet Take by mouth.      gabapentin (NEURONTIN) 600 MG tablet Take 600 mg by mouth.      mupirocin (BACTROBAN) 2 % ointment SMARTSI Application Topical 2-3 Times Daily      NARCAN 4 mg/actuation Spry 4 mg by Nasal route as needed.      nintedanib (OFEV) 100 mg Cap 100 mg.      OFEV 100 mg Cap Take by mouth.      ondansetron (ZOFRAN) 4 MG tablet Take 4 mg by mouth every 6 (six) hours as needed.      oxyCODONE (ROXICODONE) 30 MG Tab       oxyCODONE (ROXICODONE) 30 MG Tab 30 mg.      OXYGEN-AIR DELIVERY SYSTEMS MISC Oxygen for home use 3L/min via nasal cannula until discontinued Indications: lung failure causing loss of breath, lung tissue problem      polyethylene glycol (GLYCOLAX) 17 gram PwPk Take 17 g by mouth.      sulfaSALAzine (AZULFIDINE) 500 MG EC tablet 1,000 mg.      tiotropium bromide (SPIRIVA RESPIMAT) 2.5 mcg/actuation inhaler 2 puffs.      tofacitinib (XELJANZ XR) 11 mg Tb24 Take 11 mg by mouth.      tofacitinib (XELJANZ) 10 mg Tab 10 mg.      sulfamethoxazole-trimethoprim 800-160mg (BACTRIM DS) 800-160 mg Tab Take 1 tablet by mouth 2 (two) times daily. for 14 days 28 tablet 0     No current facility-administered medications for this visit.     Review of patient's allergies indicates:   Allergen Reactions    Ketorolac Hives     "Makes me break out"       Review of Systems " "  Cardiovascular:  Negative for leg swelling.   Musculoskeletal:  Positive for gait problem.   All other systems reviewed and are negative.      Objective:      Vitals:    02/20/24 1507   BP: 112/70   Pulse: 97   SpO2: 95%   Weight: 102.1 kg (225 lb)   Height: 5' 7" (1.702 m)     Physical Exam  Vitals and nursing note reviewed.   Constitutional:       General: She is not in acute distress.     Appearance: Normal appearance.   Cardiovascular:      Pulses:           Dorsalis pedis pulses are 1+ on the right side and 1+ on the left side.        Posterior tibial pulses are 1+ on the right side and 1+ on the left side.   Abdominal:      General: Abdomen is flat.   Musculoskeletal:         General: Tenderness and deformity present.      Left foot: Decreased range of motion. Deformity and prominent metatarsal heads present.      Comments: Pain an underlying bursitis sub 3rd and 5th met head left foot as plantar flexed metatarsals put repetitive pressure on these areas with the development of chronic pre ulcerative calluses.  Digital amputation left foot 2-5   Feet:      Right foot:      Skin integrity: Dry skin (Severe dry skin heels) present. No skin breakdown or erythema.      Left foot:      Skin integrity: Callus (pre ulcerative callusmedial IPJ hallux, sub 2nd and 5th met head left) and dry skin present. No skin breakdown or erythema.   Skin:     Capillary Refill: Capillary refill takes more than 3 seconds.   Neurological:      General: No focal deficit present.      Mental Status: She is alert.   Psychiatric:         Mood and Affect: Mood normal.         Behavior: Behavior normal.          Assessment:       1. Wound of right foot    2. Skin ulcer of left foot, limited to breakdown of skin    3. History of amputation of left foot    4. Peripheral vascular disease    5. Plantarflexion deformity of foot    6. Osteoarthritis of ankle and foot, unspecified laterality    7. Rheumatoid arthritis, involving unspecified site, " unspecified whether rheumatoid factor present          Plan:         Patient presents today for continued complaint of severe pain on the left foot where she has had a previous amputation directly underlying the 2nd metatarsal she has a very deep thick painful inflamed pre ulcerative hyperkeratotic lesion she also has progressive lateral drift of the patient's left hallux following the amputation.  Patient has pre ulcerative areas sub 5th bilateral.  None of these areas are currently infected patient states the left foot is keeping her from being able to walk it has gotten so progressively painful she tries to keep it trimmed down and smoothed but states it is very difficult.  I did review previous x-rays from 2022 patient does have significant spur where a portion of the 2nd metatarsal head was resected there has been bone regeneration and spurring noted at the plantar aspect of the remaining metatarsal which is causing this area of excessive buildup an ulceration.  Patient had asked me about correcting the bunion deformity I advised her this would be very difficult to do since the digits have been amputated and part of the metatarsals I feel as if she should focus on addressing the pre ulcerative area underlying the 2nd metatarsal that is causing her the greatest amount of discomfort.  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  I did put the patient on Bactrim because of her right foot and the concern over infection I do plan to see her for follow-up in a few weeks we can see her in the other office so we can take some x-rays of the left foot at that time  again she needs to continue to use the 40% urea cream as previously recommended.  Extended visit was performed today addressing all of the patient's issues I plan to see her for follow-up in 2-3 weeks unless she has any problems questions or concerns sooner.  This note was created using [employer] voice recognition software that occasionally misinterpreted phrases or words.

## 2024-03-11 ENCOUNTER — HOSPITAL ENCOUNTER (OUTPATIENT)
Dept: RADIOLOGY | Facility: HOSPITAL | Age: 59
Discharge: HOME | End: 2024-03-11
Attending: PODIATRIST
Payer: MEDICAID

## 2024-03-11 ENCOUNTER — OFFICE VISIT (OUTPATIENT)
Dept: PODIATRY | Facility: CLINIC | Age: 59
End: 2024-03-11
Payer: MEDICAID

## 2024-03-11 VITALS
HEIGHT: 67 IN | HEART RATE: 90 BPM | WEIGHT: 228 LBS | BODY MASS INDEX: 35.79 KG/M2 | DIASTOLIC BLOOD PRESSURE: 92 MMHG | SYSTOLIC BLOOD PRESSURE: 136 MMHG

## 2024-03-11 DIAGNOSIS — L97.521 ULCER OF LEFT FOOT, LIMITED TO BREAKDOWN OF SKIN: ICD-10-CM

## 2024-03-11 DIAGNOSIS — M20.12 HALLUX VALGUS OF LEFT FOOT: ICD-10-CM

## 2024-03-11 DIAGNOSIS — L97.521 SKIN ULCER OF LEFT FOOT, LIMITED TO BREAKDOWN OF SKIN: ICD-10-CM

## 2024-03-11 DIAGNOSIS — Z89.432 HISTORY OF AMPUTATION OF LEFT FOOT: ICD-10-CM

## 2024-03-11 DIAGNOSIS — M19.079 OSTEOARTHRITIS OF ANKLE AND FOOT, UNSPECIFIED LATERALITY: ICD-10-CM

## 2024-03-11 DIAGNOSIS — M21.6X9 PLANTARFLEXION DEFORMITY OF FOOT: ICD-10-CM

## 2024-03-11 DIAGNOSIS — L97.521 ULCER OF LEFT FOOT, LIMITED TO BREAKDOWN OF SKIN: Primary | ICD-10-CM

## 2024-03-11 PROCEDURE — 99999 PR PBB SHADOW E&M-EST. PATIENT-LVL V: CPT | Mod: PBBFAC,,, | Performed by: PODIATRIST

## 2024-03-11 PROCEDURE — 99214 OFFICE O/P EST MOD 30 MIN: CPT | Mod: S$PBB,,, | Performed by: PODIATRIST

## 2024-03-11 PROCEDURE — 3075F SYST BP GE 130 - 139MM HG: CPT | Mod: CPTII,,, | Performed by: PODIATRIST

## 2024-03-11 PROCEDURE — 73630 X-RAY EXAM OF FOOT: CPT | Mod: TC,LT

## 2024-03-11 PROCEDURE — 1159F MED LIST DOCD IN RCRD: CPT | Mod: CPTII,,, | Performed by: PODIATRIST

## 2024-03-11 PROCEDURE — 1160F RVW MEDS BY RX/DR IN RCRD: CPT | Mod: CPTII,,, | Performed by: PODIATRIST

## 2024-03-11 PROCEDURE — 99215 OFFICE O/P EST HI 40 MIN: CPT | Mod: PBBFAC,25 | Performed by: PODIATRIST

## 2024-03-11 PROCEDURE — 3008F BODY MASS INDEX DOCD: CPT | Mod: CPTII,,, | Performed by: PODIATRIST

## 2024-03-11 PROCEDURE — 73630 X-RAY EXAM OF FOOT: CPT | Mod: 26,LT,, | Performed by: RADIOLOGY

## 2024-03-11 PROCEDURE — 3080F DIAST BP >= 90 MM HG: CPT | Mod: CPTII,,, | Performed by: PODIATRIST

## 2024-03-12 PROBLEM — M20.12 HALLUX VALGUS OF LEFT FOOT: Status: ACTIVE | Noted: 2024-03-12

## 2024-03-12 NOTE — PROGRESS NOTES
Subjective:       Patient ID: Nevaeh Howard is a 58 y.o. female.    Chief Complaint: Foot Ulcer  Patient presents with complaint of increased pain left foot, calluses which she has developed secondary to amputation digits 2 through 5.  She relates pain through the front of this left foot has been worse than usual.  She relates always some discomfort but pain over the last few months has escalated to a pain level of 7/10.  States she does try to work on these areas keeping him smooth and apply lotion which helps but not over the last few months.  She confirms difficulty wearing an appropriate shoe, the way the big toe has drifted over she can not get into a tennis shoe.  Relates even though she applies lotion it is not penetrating the skin on her heels.  Taking gabapentin for neuropathic pain.  On pain management through her PCP.  Has history of RA, no diabetes.    Past Medical History:   Diagnosis Date    Hyperlipidemia     Hypertension     Lung disease     Lupus     RA (rheumatoid arthritis)      Past Surgical History:   Procedure Laterality Date    APPENDECTOMY      BACK SURGERY      CHOLECYSTECTOMY      ENDOMICROSCOPY      HYSTERECTOMY      TOE AMPUTATION      2345 left foot     TONSILLECTOMY       Family History   Problem Relation Age of Onset    Cancer Mother     Heart disease Father     Stroke Father      Social History     Socioeconomic History    Marital status: Single   Tobacco Use    Smoking status: Former     Types: Cigarettes    Smokeless tobacco: Never    Tobacco comments:     quit date 2018   Substance and Sexual Activity    Alcohol use: Never    Drug use: Never    Sexual activity: Not Currently       Current Outpatient Medications   Medication Sig Dispense Refill    albuterol (PROVENTIL/VENTOLIN HFA) 90 mcg/actuation inhaler Inhale 2 puffs into the lungs every 6 (six) hours as needed.      albuterol-ipratropium (DUO-NEB) 2.5 mg-0.5 mg/3 mL nebulizer solution 3 mLs.      amLODIPine (NORVASC) 5 MG tablet  "Take 5 mg by mouth.      aspirin (ECOTRIN) 81 MG EC tablet Take 81 mg by mouth.      atorvastatin (LIPITOR) 40 MG tablet Take 40 mg by mouth.      cholecalciferol, vitamin D3, (VITAMIN D3) 25 mcg (1,000 unit) capsule Take 1,000 Units by mouth.      clopidogreL (PLAVIX) 75 mg tablet Take 75 mg by mouth.      ferrous sulfate 325 (65 FE) MG EC tablet Take by mouth.      gabapentin (NEURONTIN) 600 MG tablet Take 600 mg by mouth.      NARCAN 4 mg/actuation Spry 4 mg by Nasal route as needed.      nintedanib (OFEV) 100 mg Cap 100 mg.      ondansetron (ZOFRAN) 4 MG tablet Take 4 mg by mouth every 6 (six) hours as needed.      oxyCODONE (ROXICODONE) 30 MG Tab 30 mg.      OXYGEN-AIR DELIVERY SYSTEMS MISC Oxygen for home use 3L/min via nasal cannula until discontinued Indications: lung failure causing loss of breath, lung tissue problem      sulfaSALAzine (AZULFIDINE) 500 MG EC tablet 1,000 mg.      tiotropium bromide (SPIRIVA RESPIMAT) 2.5 mcg/actuation inhaler 2 puffs.       No current facility-administered medications for this visit.     Review of patient's allergies indicates:   Allergen Reactions    Ketorolac Hives     "Makes me break out"       Review of Systems   Cardiovascular:  Negative for leg swelling.   Musculoskeletal:  Positive for gait problem.   All other systems reviewed and are negative.      Objective:      Vitals:    03/11/24 1400   BP: (!) 136/92   BP Location: Right arm   Patient Position: Sitting   Pulse: 90   Weight: 103.4 kg (228 lb)   Height: 5' 7" (1.702 m)     Physical Exam  Vitals and nursing note reviewed.   Constitutional:       General: She is not in acute distress.     Appearance: Normal appearance.   Cardiovascular:      Pulses:           Dorsalis pedis pulses are 1+ on the right side and 1+ on the left side.        Posterior tibial pulses are 1+ on the right side and 1+ on the left side.   Abdominal:      General: Abdomen is flat.   Musculoskeletal:         General: Tenderness and deformity " present.      Left foot: Decreased range of motion. Deformity and prominent metatarsal heads present.      Comments: Pain an underlying bursitis sub 3rd and 5th met head left foot as plantar flexed metatarsals put repetitive pressure on these areas with the development of chronic pre ulcerative calluses.  Digital amputation left foot 2-5   Feet:      Right foot:      Skin integrity: Dry skin (Severe dry skin heels) present. No skin breakdown or erythema.      Left foot:      Skin integrity: Callus (pre ulcerative callusmedial IPJ hallux, sub 2nd and 5th met head left) and dry skin present. No skin breakdown or erythema.   Skin:     Capillary Refill: Capillary refill takes more than 3 seconds.   Neurological:      General: No focal deficit present.      Mental Status: She is alert.   Psychiatric:         Mood and Affect: Mood normal.         Behavior: Behavior normal.                        Assessment:       1. Ulcer of left foot, limited to breakdown of skin    2. Plantarflexion deformity of foot    3. History of amputation of left foot    4. Osteoarthritis of ankle and foot, unspecified laterality    5. Skin ulcer of left foot, limited to breakdown of skin    6. Hallux valgus of left foot          Plan:         Patient presents today for continued complaint of severe pain on the left foot where she has had a previous amputation directly underlying the 2nd metatarsal she has a very deep thick painful inflamed pre ulcerative hyperkeratotic lesion she also has progressive lateral drift of the patient's left hallux following the amputation.  Patient has pre ulcerative areas sub 5th bilateral.  None of these areas are currently infected patient states the left foot is keeping her from being able to walk it has gotten so progressively painful she tries to keep it trimmed down and smoothed but states it is very difficult.  I did review x-rays patient does have significant spur where a portion of the 2nd metatarsal head  was resected there has been bone regeneration and spurring noted at the plantar aspect of the remaining metatarsal which is causing this area of excessive buildup an ulceration.  Patient had asked me about correcting the bunion deformity I advised her this would be very difficult to do since the digits have been amputated and part of the metatarsals.  Patient's bunion has gotten significantly worse as has the lateral drift of the patient's left hallux the left hallux is now dislocated at the level of the 1st MPJ left and the patient is having a lot of pain overlying the joint due to the hallux dislocation and plantar flexion.  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  Recommended follow-up will be as needed patient advised from time to time we will have to trimmed down the pre ulcerative area to prevent it from worsening.  X-rays reviewed in detail. This note was created using [employer] voice recognition software that occasionally misinterpreted phrases or words.